# Patient Record
Sex: FEMALE | Race: BLACK OR AFRICAN AMERICAN | NOT HISPANIC OR LATINO | Employment: OTHER | URBAN - METROPOLITAN AREA
[De-identification: names, ages, dates, MRNs, and addresses within clinical notes are randomized per-mention and may not be internally consistent; named-entity substitution may affect disease eponyms.]

---

## 2017-01-11 ENCOUNTER — LAB VISIT (OUTPATIENT)
Dept: LAB | Facility: HOSPITAL | Age: 67
End: 2017-01-11
Attending: INTERNAL MEDICINE
Payer: MEDICARE

## 2017-01-11 ENCOUNTER — HOSPITAL ENCOUNTER (OUTPATIENT)
Dept: RADIOLOGY | Facility: HOSPITAL | Age: 67
Discharge: HOME | End: 2017-01-11
Attending: INTERNAL MEDICINE
Payer: MEDICARE

## 2017-01-11 DIAGNOSIS — C34.92 SQUAMOUS CELL CARCINOMA OF LEFT LUNG: ICD-10-CM

## 2017-01-11 LAB
ALBUMIN SERPL BCP-MCNC: 3.8 G/DL
ALP SERPL-CCNC: 109 U/L
ALT SERPL W/O P-5'-P-CCNC: 7 U/L
ANION GAP SERPL CALC-SCNC: 7 MMOL/L
AST SERPL-CCNC: 13 U/L
BASOPHILS # BLD AUTO: 0.01 K/UL
BASOPHILS NFR BLD: 0.2 %
BILIRUB SERPL-MCNC: 0.4 MG/DL
BUN SERPL-MCNC: 15 MG/DL
CALCIUM SERPL-MCNC: 9.9 MG/DL
CHLORIDE SERPL-SCNC: 105 MMOL/L
CO2 SERPL-SCNC: 27 MMOL/L
CREAT SERPL-MCNC: 0.8 MG/DL
DIFFERENTIAL METHOD: ABNORMAL
EOSINOPHIL # BLD AUTO: 0.1 K/UL
EOSINOPHIL NFR BLD: 1.3 %
ERYTHROCYTE [DISTWIDTH] IN BLOOD BY AUTOMATED COUNT: 19 %
EST. GFR  (AFRICAN AMERICAN): >60 ML/MIN/1.73 M^2
EST. GFR  (NON AFRICAN AMERICAN): >60 ML/MIN/1.73 M^2
GLUCOSE SERPL-MCNC: 97 MG/DL
HCT VFR BLD AUTO: 31 %
HGB BLD-MCNC: 9.5 G/DL
LYMPHOCYTES # BLD AUTO: 1.3 K/UL
LYMPHOCYTES NFR BLD: 25 %
MAGNESIUM SERPL-MCNC: 1.7 MG/DL
MCH RBC QN AUTO: 26 PG
MCHC RBC AUTO-ENTMCNC: 30.6 %
MCV RBC AUTO: 85 FL
MONOCYTES # BLD AUTO: 0.5 K/UL
MONOCYTES NFR BLD: 9.1 %
NEUTROPHILS # BLD AUTO: 3.3 K/UL
NEUTROPHILS NFR BLD: 64.2 %
PHOSPHATE SERPL-MCNC: 3.1 MG/DL
PLATELET # BLD AUTO: 362 K/UL
PMV BLD AUTO: 11.4 FL
POTASSIUM SERPL-SCNC: 3.7 MMOL/L
PROT SERPL-MCNC: 7.5 G/DL
RBC # BLD AUTO: 3.66 M/UL
SODIUM SERPL-SCNC: 139 MMOL/L
WBC # BLD AUTO: 5.19 K/UL

## 2017-01-11 PROCEDURE — 80053 COMPREHEN METABOLIC PANEL: CPT

## 2017-01-11 PROCEDURE — 83735 ASSAY OF MAGNESIUM: CPT

## 2017-01-11 PROCEDURE — 84100 ASSAY OF PHOSPHORUS: CPT

## 2017-01-11 PROCEDURE — 36415 COLL VENOUS BLD VENIPUNCTURE: CPT

## 2017-01-11 PROCEDURE — 71260 CT THORAX DX C+: CPT | Mod: 26,,, | Performed by: RADIOLOGY

## 2017-01-11 PROCEDURE — 85025 COMPLETE CBC W/AUTO DIFF WBC: CPT

## 2017-01-11 PROCEDURE — 74177 CT ABD & PELVIS W/CONTRAST: CPT | Mod: 26,,, | Performed by: RADIOLOGY

## 2017-01-11 RX ADMIN — IOHEXOL 30 ML: 350 INJECTION, SOLUTION INTRAVENOUS at 07:01

## 2017-01-11 RX ADMIN — IOHEXOL 75 ML: 350 INJECTION, SOLUTION INTRAVENOUS at 09:01

## 2017-01-12 ENCOUNTER — OFFICE VISIT (OUTPATIENT)
Dept: HEMATOLOGY/ONCOLOGY | Facility: CLINIC | Age: 67
End: 2017-01-12
Payer: MEDICARE

## 2017-01-12 VITALS
SYSTOLIC BLOOD PRESSURE: 134 MMHG | OXYGEN SATURATION: 98 % | HEART RATE: 77 BPM | TEMPERATURE: 98 F | BODY MASS INDEX: 19.27 KG/M2 | WEIGHT: 104.75 LBS | DIASTOLIC BLOOD PRESSURE: 64 MMHG | HEIGHT: 62 IN

## 2017-01-12 DIAGNOSIS — C78.02 MALIGNANT NEOPLASM METASTATIC TO LEFT LUNG: ICD-10-CM

## 2017-01-12 DIAGNOSIS — C34.92 SQUAMOUS CELL CARCINOMA OF LEFT LUNG: Primary | ICD-10-CM

## 2017-01-12 DIAGNOSIS — C34.92 STAGE IV SQUAMOUS CELL CARCINOMA OF LEFT LUNG: ICD-10-CM

## 2017-01-12 PROCEDURE — 99499 UNLISTED E&M SERVICE: CPT | Mod: S$PBB,,, | Performed by: INTERNAL MEDICINE

## 2017-01-12 PROCEDURE — 99999 PR PBB SHADOW E&M-EST. PATIENT-LVL III: CPT | Mod: PBBFAC,,, | Performed by: INTERNAL MEDICINE

## 2017-01-12 PROCEDURE — 3078F DIAST BP <80 MM HG: CPT | Mod: S$GLB,,, | Performed by: INTERNAL MEDICINE

## 2017-01-12 PROCEDURE — 99215 OFFICE O/P EST HI 40 MIN: CPT | Mod: S$GLB,,, | Performed by: INTERNAL MEDICINE

## 2017-01-12 PROCEDURE — 1126F AMNT PAIN NOTED NONE PRSNT: CPT | Mod: S$GLB,,, | Performed by: INTERNAL MEDICINE

## 2017-01-12 PROCEDURE — 1159F MED LIST DOCD IN RCRD: CPT | Mod: S$GLB,,, | Performed by: INTERNAL MEDICINE

## 2017-01-12 PROCEDURE — 1160F RVW MEDS BY RX/DR IN RCRD: CPT | Mod: S$GLB,,, | Performed by: INTERNAL MEDICINE

## 2017-01-12 PROCEDURE — 3075F SYST BP GE 130 - 139MM HG: CPT | Mod: S$GLB,,, | Performed by: INTERNAL MEDICINE

## 2017-01-12 PROCEDURE — 1157F ADVNC CARE PLAN IN RCRD: CPT | Mod: S$GLB,,, | Performed by: INTERNAL MEDICINE

## 2017-01-12 NOTE — PROGRESS NOTES
Subjective:       Patient ID: Andree Quiroz is a 66 y.o. female.    Chief Complaint: Lung Cancer (CT, lab results)    Lung Cancer   Pertinent negatives include no abdominal pain, chest pain, coughing, fatigue, fever, headaches, nausea, rash or weakness.      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  She had some nausea and dizziness at [employer], went to the ER, chest x-ray was ordered that showed a left suprahilar lesion.  CT of the chest with contrast 9/20/15 Showed 6.5 by 4.4 centimeter left upper lobe mass abutting the mediastinum.  CT-guided biopsy showed squamous cell carcinoma. PET scan did not show any extrathoracic disease. EBUS revealed - Station 4R (contralateral mediastinal lymph node) was negative. Station 7 (subcarinal) was positive for squamous cell carcinoma. Was felt not to be a surgical candidate.    Nov-Dec 2015 - Received concurrent ChemoRT with weekly carbo/taxol.  PET scan July 2016 showed progressive disease.    August 2016 - 2nd line chemotherapy with carboplatin and gemcitabine was started.    Here for follow-up  Denies any chest pain, difficulty breathing or hemoptysis.  No new pains.  No cough.  Continues to stay active.  She was not able to afford Megace but is trying to eat and maintain her weight.  Underwent CT scan and is here to discuss test results.    Review of Systems   Constitutional: Negative for appetite change, fatigue, fever and unexpected weight change.   HENT: Negative for facial swelling and nosebleeds.    Eyes: Negative for photophobia and pain.   Respiratory: Negative for cough and shortness of breath.    Cardiovascular: Negative for chest pain and leg swelling.   Gastrointestinal: Negative for abdominal pain, blood in stool, constipation and nausea.   Genitourinary: Negative for dysuria and hematuria.   Skin: Negative for color change and rash.   Neurological: Negative for seizures, weakness and headaches.    Hematological: Negative for adenopathy. Does not bruise/bleed easily.   All other systems reviewed and are negative.        Objective:      Physical Exam   Constitutional: She is oriented to person, place, and time. She appears well-developed and well-nourished. No distress.   HENT:   Mouth/Throat: Oropharynx is clear and moist and mucous membranes are normal. Mucous membranes are not pale. No oropharyngeal exudate.   Eyes: Conjunctivae are normal. No scleral icterus.   Neck: Normal range of motion. Neck supple. No thyroid mass (Thyroid area is non-tender) and no thyromegaly present.   Cardiovascular: Normal rate and regular rhythm.  Exam reveals no friction rub.    Pulmonary/Chest: Effort normal and breath sounds normal. No accessory muscle usage or stridor. No respiratory distress. She has no wheezes.   Abdominal: She exhibits no ascites and no mass. There is no hepatosplenomegaly. There is no tenderness.   Musculoskeletal: She exhibits no edema.   No varicosities noted.   Lymphadenopathy:     She has no cervical adenopathy.        Right: No supraclavicular adenopathy present.        Left: No supraclavicular adenopathy present.   Neurological: She is alert and oriented to person, place, and time.   Psychiatric: She has a normal mood and affect. Judgment and thought content normal. Cognition and memory are normal. She exhibits normal recent memory and normal remote memory.       Assessment:     1. Squamous cell carcinoma of left lung    2. 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 Malignant neoplasm metastatic to left lung       Plan:   CT chest 1/11/17 - Continued progression of disease with interval increase in size of bilateral pulmonary nodules.    Reviewed results independently and with her in detail.  There is clear evidence of progressive disease.  Will discontinue carboplatin and gemcitabine chemotherapy.    Discussed treatment with immunotherapy.  Discussed Keytruda versus opdivo.  Discussed  differences.  Provided printed information on both.    Will send her tumor sample for PDL 1 testing.  She is agreeable with this.  Depending on the results we'll decide if she should be started on Keytruda or opdivo.    All questions answered.  Return to clinic when test results available.

## 2017-01-23 ENCOUNTER — TELEPHONE (OUTPATIENT)
Dept: HEMATOLOGY/ONCOLOGY | Facility: CLINIC | Age: 67
End: 2017-01-23

## 2017-01-23 NOTE — TELEPHONE ENCOUNTER
Wants to know if you have heard anything back from pathology re: her results. She has been reviewing the information on two medications you were recommending. She is worried about all of the side effects.     ----- Message from Eloina Damon sent at 1/23/2017  2:17 PM CST -----  Contact: self/[phone]  Patient is calling to get her test results.  Please advise

## 2017-01-26 NOTE — TELEPHONE ENCOUNTER
Pt informed that per Dr. Sandra, pathology results still pending and may take another week or so. Instructed pt to call back in one week if she has not heard from anyone. Pt verbalized understanding.

## 2017-01-26 NOTE — TELEPHONE ENCOUNTER
Dr. Sandra states he has received pt's pathology results so I called and scheduled pt for appt on 1/31/17 to discuss the results.

## 2017-01-31 ENCOUNTER — OFFICE VISIT (OUTPATIENT)
Dept: HEMATOLOGY/ONCOLOGY | Facility: CLINIC | Age: 67
End: 2017-01-31
Payer: MEDICARE

## 2017-01-31 VITALS
WEIGHT: 103.19 LBS | TEMPERATURE: 98 F | HEART RATE: 86 BPM | DIASTOLIC BLOOD PRESSURE: 62 MMHG | OXYGEN SATURATION: 96 % | SYSTOLIC BLOOD PRESSURE: 124 MMHG | HEIGHT: 62 IN | BODY MASS INDEX: 18.99 KG/M2

## 2017-01-31 DIAGNOSIS — I25.10 CORONARY ARTERY DISEASE INVOLVING NATIVE CORONARY ARTERY OF NATIVE HEART WITHOUT ANGINA PECTORIS: ICD-10-CM

## 2017-01-31 DIAGNOSIS — C78.02 MALIGNANT NEOPLASM METASTATIC TO LEFT LUNG: ICD-10-CM

## 2017-01-31 DIAGNOSIS — C34.92 STAGE IV SQUAMOUS CELL CARCINOMA OF LEFT LUNG: Primary | ICD-10-CM

## 2017-01-31 DIAGNOSIS — Z51.11 ENCOUNTER FOR ANTINEOPLASTIC CHEMOTHERAPY: ICD-10-CM

## 2017-01-31 PROCEDURE — 1157F ADVNC CARE PLAN IN RCRD: CPT | Mod: S$GLB,,, | Performed by: INTERNAL MEDICINE

## 2017-01-31 PROCEDURE — 99214 OFFICE O/P EST MOD 30 MIN: CPT | Mod: S$GLB,,, | Performed by: INTERNAL MEDICINE

## 2017-01-31 PROCEDURE — 3074F SYST BP LT 130 MM HG: CPT | Mod: S$GLB,,, | Performed by: INTERNAL MEDICINE

## 2017-01-31 PROCEDURE — 1160F RVW MEDS BY RX/DR IN RCRD: CPT | Mod: S$GLB,,, | Performed by: INTERNAL MEDICINE

## 2017-01-31 PROCEDURE — 99499 UNLISTED E&M SERVICE: CPT | Mod: S$PBB,,, | Performed by: INTERNAL MEDICINE

## 2017-01-31 PROCEDURE — 99999 PR PBB SHADOW E&M-EST. PATIENT-LVL III: CPT | Mod: PBBFAC,,, | Performed by: INTERNAL MEDICINE

## 2017-01-31 PROCEDURE — 1159F MED LIST DOCD IN RCRD: CPT | Mod: S$GLB,,, | Performed by: INTERNAL MEDICINE

## 2017-01-31 PROCEDURE — 1126F AMNT PAIN NOTED NONE PRSNT: CPT | Mod: S$GLB,,, | Performed by: INTERNAL MEDICINE

## 2017-01-31 PROCEDURE — 3078F DIAST BP <80 MM HG: CPT | Mod: S$GLB,,, | Performed by: INTERNAL MEDICINE

## 2017-01-31 RX ORDER — HEPARIN 100 UNIT/ML
500 SYRINGE INTRAVENOUS
Status: CANCELLED | OUTPATIENT
Start: 2017-04-03

## 2017-01-31 RX ORDER — SODIUM CHLORIDE 0.9 % (FLUSH) 0.9 %
10 SYRINGE (ML) INJECTION
Status: CANCELLED | OUTPATIENT
Start: 2017-04-03

## 2017-01-31 NOTE — PROGRESS NOTES
Subjective:       Patient ID: Andree Quiroz is a 66 y.o. female.    Chief Complaint: Lung Cancer (pathology results)    Lung Cancer   Pertinent negatives include no abdominal pain, chest pain, coughing, fatigue, fever, headaches, nausea, rash or weakness.      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  She had some nausea and dizziness at [employer], went to the ER, chest x-ray was ordered that showed a left suprahilar lesion.  CT of the chest with contrast 9/20/15 Showed 6.5 by 4.4 centimeter left upper lobe mass abutting the mediastinum.  CT-guided biopsy showed squamous cell carcinoma. PET scan did not show any extrathoracic disease. EBUS revealed - Station 4R (contralateral mediastinal lymph node) was negative. Station 7 (subcarinal) was positive for squamous cell carcinoma. Was felt not to be a surgical candidate.    Nov-Dec 2015 - Received concurrent ChemoRT with weekly carbo/taxol.  PET scan July 2016 showed progressive disease.    August 2016 - 2nd line chemotherapy with carboplatin and gemcitabine was started.    Here for follow-up  Denies any chest pain, difficulty breathing or hemoptysis.  No new pains.  No cough.  Continues to stay active.    CT chest 1/11/17 - Continued progression of disease with interval increase in size of bilateral pulmonary nodules.    PDL 1 testing ordered on lung biopsy sample, patient here to discuss test results.    Review of Systems   Constitutional: Negative for appetite change, fatigue, fever and unexpected weight change.   HENT: Negative for facial swelling and nosebleeds.    Eyes: Negative for photophobia and pain.   Respiratory: Negative for cough and shortness of breath.    Cardiovascular: Negative for chest pain and leg swelling.   Gastrointestinal: Negative for abdominal pain, blood in stool, constipation and nausea.   Genitourinary: Negative for dysuria and hematuria.   Skin: Negative for color change and rash.    Neurological: Negative for seizures, weakness and headaches.   Hematological: Negative for adenopathy. Does not bruise/bleed easily.   All other systems reviewed and are negative.        Objective:      Physical Exam   Constitutional: She is oriented to person, place, and time. She appears well-developed and well-nourished. No distress.   HENT:   Mouth/Throat: Oropharynx is clear and moist and mucous membranes are normal. Mucous membranes are not pale. No oropharyngeal exudate.   Eyes: Conjunctivae are normal. No scleral icterus.   Neck: Normal range of motion. Neck supple. No thyroid mass (Thyroid area is non-tender) and no thyromegaly present.   Cardiovascular: Normal rate and regular rhythm.  Exam reveals no friction rub.    Pulmonary/Chest: Effort normal and breath sounds normal. No accessory muscle usage or stridor. No respiratory distress. She has no wheezes.   Abdominal: She exhibits no ascites and no mass. There is no hepatosplenomegaly. There is no tenderness.   Musculoskeletal: She exhibits no edema.   No varicosities noted.   Lymphadenopathy:     She has no cervical adenopathy.        Right: No supraclavicular adenopathy present.        Left: No supraclavicular adenopathy present.   Neurological: She is alert and oriented to person, place, and time.   Psychiatric: She has a normal mood and affect. Judgment and thought content normal. Cognition and memory are normal. She exhibits normal recent memory and normal remote memory.       Assessment:     1. 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 Encounter for antineoplastic chemotherapy    3. Malignant neoplasm metastatic to left lung    4. Coronary artery disease involving native coronary artery of native heart without angina pectoris       Plan:   PDL 1 testing shows 10% (low expression) TPS - tumor proportion score.  She is hence PDL 1 positive, although low expression.    Discussed starting KEYTRUDA versus opdivo.  Discussed pros and cons of  each of these approaches.  She had the opportunity to review literature on both these agents and answered her many questions in this regard.  We discussed potential side effects of a immune mediated pneumonitis, immune mediated enteritis, immune mediated hepatitis and other such immune mediated side effects with this class of immunotherapy agents.    She states she wants to take care of her teeth and eyes - see the concerning specialists.   Wants to start her treatment in about 3 weeks.  Will honor her wishes.

## 2017-02-02 ENCOUNTER — TELEPHONE (OUTPATIENT)
Dept: HEMATOLOGY/ONCOLOGY | Facility: CLINIC | Age: 67
End: 2017-02-02

## 2017-02-02 DIAGNOSIS — R53.1 WEAKNESS: Primary | ICD-10-CM

## 2017-02-02 NOTE — TELEPHONE ENCOUNTER
Notified patient that startdate for Keytruda is Feb 20 as she had requested 3-week break when she was here on 1/31.  Patient now wants to wait til March as she feels her body needs a longer break.  Dr Sandra gave ok.  Will start March 13.  Patient agreeable.  Labs 3/10 at [facility].  Will mail her schedule.

## 2017-02-03 ENCOUNTER — HOSPITAL ENCOUNTER (OUTPATIENT)
Facility: HOSPITAL | Age: 67
Discharge: HOME | End: 2017-02-04
Attending: EMERGENCY MEDICINE | Admitting: INTERNAL MEDICINE
Payer: MEDICARE
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
## 2017-02-03 DIAGNOSIS — C34.92 STAGE IV SQUAMOUS CELL CARCINOMA OF LEFT LUNG: ICD-10-CM

## 2017-02-03 DIAGNOSIS — D50.9 MICROCYTIC ANEMIA: ICD-10-CM

## 2017-02-03 DIAGNOSIS — E78.5 HYPERLIPIDEMIA, UNSPECIFIED HYPERLIPIDEMIA TYPE: ICD-10-CM

## 2017-02-03 DIAGNOSIS — R53.1 WEAKNESS: ICD-10-CM

## 2017-02-03 DIAGNOSIS — C34.91 SQUAMOUS CELL CARCINOMA OF LUNGS, BILATERAL: ICD-10-CM

## 2017-02-03 DIAGNOSIS — J06.9 VIRAL UPPER RESPIRATORY ILLNESS: ICD-10-CM

## 2017-02-03 DIAGNOSIS — I10 ESSENTIAL HYPERTENSION: ICD-10-CM

## 2017-02-03 DIAGNOSIS — C34.92 SQUAMOUS CELL CARCINOMA OF LUNGS, BILATERAL: ICD-10-CM

## 2017-02-03 DIAGNOSIS — J18.9 HOSPITAL-ACQUIRED PNEUMONIA: Primary | ICD-10-CM

## 2017-02-03 DIAGNOSIS — I25.10 CORONARY ARTERY DISEASE INVOLVING NATIVE CORONARY ARTERY OF NATIVE HEART WITHOUT ANGINA PECTORIS: ICD-10-CM

## 2017-02-03 DIAGNOSIS — Z72.0 TOBACCO ABUSE: ICD-10-CM

## 2017-02-03 LAB
ALBUMIN SERPL BCP-MCNC: 3 G/DL
ALP SERPL-CCNC: 100 U/L
ALT SERPL W/O P-5'-P-CCNC: 7 U/L
ANION GAP SERPL CALC-SCNC: 9 MMOL/L
AST SERPL-CCNC: 11 U/L
BASOPHILS # BLD AUTO: 0.01 K/UL
BASOPHILS NFR BLD: 0.2 %
BILIRUB SERPL-MCNC: 0.3 MG/DL
BNP SERPL-MCNC: 82 PG/ML
BUN SERPL-MCNC: 8 MG/DL
CALCIUM SERPL-MCNC: 8.2 MG/DL
CHLORIDE SERPL-SCNC: 109 MMOL/L
CO2 SERPL-SCNC: 21 MMOL/L
CREAT SERPL-MCNC: 0.7 MG/DL
DIFFERENTIAL METHOD: ABNORMAL
EOSINOPHIL # BLD AUTO: 0.1 K/UL
EOSINOPHIL NFR BLD: 1.8 %
ERYTHROCYTE [DISTWIDTH] IN BLOOD BY AUTOMATED COUNT: 18.6 %
EST. GFR  (AFRICAN AMERICAN): >60 ML/MIN/1.73 M^2
EST. GFR  (NON AFRICAN AMERICAN): >60 ML/MIN/1.73 M^2
GLUCOSE SERPL-MCNC: 123 MG/DL
HCT VFR BLD AUTO: 26.5 %
HGB BLD-MCNC: 8.2 G/DL
LYMPHOCYTES # BLD AUTO: 0.4 K/UL
LYMPHOCYTES NFR BLD: 7.5 %
MCH RBC QN AUTO: 24.8 PG
MCHC RBC AUTO-ENTMCNC: 30.9 %
MCV RBC AUTO: 80 FL
MONOCYTES # BLD AUTO: 0.7 K/UL
MONOCYTES NFR BLD: 12.5 %
NEUTROPHILS # BLD AUTO: 4.3 K/UL
NEUTROPHILS NFR BLD: 78 %
PLATELET # BLD AUTO: 247 K/UL
PMV BLD AUTO: 11.6 FL
POCT GLUCOSE: 123 MG/DL (ref 70–110)
POTASSIUM SERPL-SCNC: 3.3 MMOL/L
PROT SERPL-MCNC: 6.2 G/DL
RBC # BLD AUTO: 3.3 M/UL
SODIUM SERPL-SCNC: 139 MMOL/L
TROPONIN I SERPL DL<=0.01 NG/ML-MCNC: <0.006 NG/ML
WBC # BLD AUTO: 5.46 K/UL

## 2017-02-03 PROCEDURE — 96361 HYDRATE IV INFUSION ADD-ON: CPT

## 2017-02-03 PROCEDURE — 82746 ASSAY OF FOLIC ACID SERUM: CPT

## 2017-02-03 PROCEDURE — 93005 ELECTROCARDIOGRAM TRACING: CPT

## 2017-02-03 PROCEDURE — 93010 ELECTROCARDIOGRAM REPORT: CPT | Mod: ,,, | Performed by: INTERNAL MEDICINE

## 2017-02-03 PROCEDURE — 82962 GLUCOSE BLOOD TEST: CPT

## 2017-02-03 PROCEDURE — 96365 THER/PROPH/DIAG IV INF INIT: CPT

## 2017-02-03 PROCEDURE — 99285 EMERGENCY DEPT VISIT HI MDM: CPT | Mod: 25

## 2017-02-03 PROCEDURE — 83540 ASSAY OF IRON: CPT

## 2017-02-03 PROCEDURE — 81003 URINALYSIS AUTO W/O SCOPE: CPT

## 2017-02-03 PROCEDURE — 82607 VITAMIN B-12: CPT

## 2017-02-03 PROCEDURE — 84484 ASSAY OF TROPONIN QUANT: CPT

## 2017-02-03 PROCEDURE — 85610 PROTHROMBIN TIME: CPT

## 2017-02-03 PROCEDURE — 85025 COMPLETE CBC W/AUTO DIFF WBC: CPT

## 2017-02-03 PROCEDURE — 82728 ASSAY OF FERRITIN: CPT

## 2017-02-03 PROCEDURE — 80053 COMPREHEN METABOLIC PANEL: CPT

## 2017-02-03 PROCEDURE — 25000003 PHARM REV CODE 250: Performed by: EMERGENCY MEDICINE

## 2017-02-03 PROCEDURE — 83880 ASSAY OF NATRIURETIC PEPTIDE: CPT

## 2017-02-03 RX ORDER — SODIUM CHLORIDE 9 MG/ML
1000 INJECTION, SOLUTION INTRAVENOUS
Status: COMPLETED | OUTPATIENT
Start: 2017-02-03 | End: 2017-02-04

## 2017-02-03 RX ADMIN — SODIUM CHLORIDE 1000 ML: 0.9 INJECTION, SOLUTION INTRAVENOUS at 11:02

## 2017-02-03 NOTE — IP AVS SNAPSHOT
[facility]  [address]  [place] [address]  Phone: [phone]           Patient Discharge Instructions     Our goal is to set you up for success. This packet includes information on your condition, medications, and your home care. It will help you to care for yourself so you don't get sicker and need to go back to the hospital.     Please ask your nurse if you have any questions.        There are many details to remember when preparing to leave the hospital. Here is what you will need to do:    1. Take your medicine. If you are prescribed medications, review your Medication List in the following pages. You may have new medications to  at the pharmacy and others that you'll need to stop taking. Review the instructions for how and when to take your medications. Talk with your doctor or nurses if you are unsure of what to do.     2. Go to your follow-up appointments. Specific follow-up information is listed in the following pages. Your may be contacted by a transition nurse or clinical provider about future appointments. Be sure we have all of the phone numbers to reach you, if needed. Please contact your provider's office if you are unable to make an appointment.     3. Watch for warning signs. Your doctor or nurse will give you detailed warning signs to watch for and when to call for assistance. These instructions may also include educational information about your condition. If you experience any of warning signs to your health, call your doctor.               ** Verify the list of medication(s) below is accurate and up to date. Carry this with you in case of emergency. If your medications have changed, please notify your healthcare provider.             Medication List      START taking these medications        Additional Info                      benzonatate 100 MG capsule   Commonly known as:  TESSALON   Quantity:  15 capsule   Refills:  1   Dose:  100 mg    Instructions:  Take 1  capsule (100 mg total) by mouth 3 (three) times daily as needed for Cough.     Begin Date    AM    Noon    PM    Bedtime       docusate sodium 100 MG capsule   Commonly known as:  COLACE   Quantity:  60 capsule   Refills:  2   Dose:  100 mg    Instructions:  Take 1 capsule (100 mg total) by mouth 2 (two) times daily.     Begin Date    AM    Noon    PM    Bedtime       ferrous sulfate 325 (65 FE) MG EC tablet   Quantity:  60 tablet   Refills:  2   Dose:  325 mg    Instructions:  Take 1 tablet (325 mg total) by mouth 2 (two) times daily with meals.     Begin Date    AM    Noon    PM    Bedtime       moxifloxacin 400 mg tablet   Commonly known as:  AVELOX   Quantity:  5 tablet   Refills:  0   Dose:  400 mg   Indications:  Pneumonia    Last time this was given:  400 mg on 2/4/2017  1:05 PM   Instructions:  Take 1 tablet (400 mg total) by mouth once daily.     Begin Date    AM    Noon    PM    Bedtime       oseltamivir 30 MG capsule   Commonly known as:  TAMIFLU   Quantity:  9 capsule   Refills:  0   Dose:  30 mg    Last time this was given:  30 mg on 2/4/2017  5:09 AM   Instructions:  Take 1 capsule (30 mg total) by mouth every 12 (twelve) hours.     Begin Date    AM    Noon    PM    Bedtime         CONTINUE taking these medications        Additional Info                      amlodipine-benazepril 5-20 mg 5-20 mg per capsule   Commonly known as:  LOTREL   Quantity:  30 capsule   Refills:  2    Last time this was given:  1 capsule on 2/4/2017 10:23 AM   Instructions:  TAKE 1 CAPSULE BY MOUTH ONCE DAILY.     Begin Date    AM    Noon    PM    Bedtime       aspirin 81 MG EC tablet   Commonly known as:  ECOTRIN   Refills:  0   Dose:  81 mg    Last time this was given:  81 mg on 2/4/2017 10:23 AM   Instructions:  Take 81 mg by mouth once daily.     Begin Date    AM    Noon    PM    Bedtime       atorvastatin 20 MG tablet   Commonly known as:  LIPITOR   Quantity:  30 tablet   Refills:  12    Last time this was given:  10 mg on  2/4/2017 10:23 AM   Instructions:  TAKE 1/2 TABLET AT BEDTIME     Begin Date    AM    Noon    PM    Bedtime       carvedilol 12.5 MG tablet   Commonly known as:  COREG   Quantity:  180 tablet   Refills:  3   Dose:  12.5 mg    Last time this was given:  12.5 mg on 2/4/2017  8:20 AM   Instructions:  Take 1 tablet (12.5 mg total) by mouth 2 (two) times daily with meals.     Begin Date    AM    Noon    PM    Bedtime       clopidogrel 75 mg tablet   Commonly known as:  PLAVIX   Quantity:  30 tablet   Refills:  3   Dose:  75 mg    Last time this was given:  75 mg on 2/4/2017 10:22 AM   Instructions:  Take 1 tablet (75 mg total) by mouth once daily.     Begin Date    AM    Noon    PM    Bedtime       lactulose 10 gram/15 mL solution   Commonly known as:  CHRONULAC   Refills:  0    Instructions:  Take by mouth 3 (three) times daily. TAKES AS NEEDED FOR CONSTIPATION     Begin Date    AM    Noon    PM    Bedtime       ondansetron 4 MG tablet   Commonly known as:  ZOFRAN   Quantity:  20 tablet   Refills:  0   Dose:  4 mg    Instructions:  Take 1 tablet (4 mg total) by mouth every 8 (eight) hours as needed.     Begin Date    AM    Noon    PM    Bedtime            Where to Get Your Medications      You can get these medications from any pharmacy     Bring a paper prescription for each of these medications     benzonatate 100 MG capsule    docusate sodium 100 MG capsule    ferrous sulfate 325 (65 FE) MG EC tablet    moxifloxacin 400 mg tablet    oseltamivir 30 MG capsule                  Please bring to all follow up appointments:    1. A copy of your discharge instructions.  2. All medicines you are currently taking in their original bottles.  3. Identification and insurance card.    Please arrive 15 minutes ahead of scheduled appointment time.    Please call 24 hours in advance if you must reschedule your appointment and/or time.        Your Scheduled Appointments     Mar 10, 2017 10:00 AM CST   Non-Fasting Lab with APPOINTMENT  LAB, [facility]    [address]  [place] [address]   [phone]            Mar 13, 2017  1:00 PM CDT   Established Patient Visit with MD [place] Head - Hematology Oncology [facility])    [address] [id number] [phone]            Mar 13, 2017  1:30 PM CDT   Infusion 120 Min with CHAIR 04 [facility]    [address], [address]  [place] [address]   [phone]            Mar 31, 2017 10:00 AM CDT   Non-Fasting Lab with HOSPITAL LAB, [facility] ([facility])    [address] [place] [id number]               Apr 03, 2017 10:00 AM CDT   Established Patient Visit with MD [place] Head - Hematology Oncology [facility])    [address] [place] [place] [address] [phone]              Follow-up Information     Follow up with Bertha Sherwood NP. Schedule an appointment as soon as possible for a visit in 1 week.    Specialty:  Family Medicine    Contact information:    [address]  [address]  [facility] [address] [phone]          Follow up with Saul Sandra MD. Schedule an appointment as soon as possible for a visit in 1 week.    Specialties:  Hematology and Oncology, Hematology    Contact information:    [address] [place] [address]  [phone]          Discharge Instructions     Future Orders    Activity as tolerated     Call MD for:  difficulty breathing or increased cough     Call MD for:  increased confusion or weakness     Call MD for:  persistent dizziness, light-headedness, or visual disturbances     Call MD for:  redness, tenderness, or signs of infection (pain, swelling, redness, odor or green/yellow discharge around incision site)     Call MD for:  temperature >100.4     Diet general     Questions:    Total calories:      Fat restriction, if any:      Protein  "restriction, if any:      Na restriction, if any:      Fluid restriction:      Additional restrictions:  Cardiac (Low Na/Chol)        Discharge Instructions       BENZONATATE CAPSULES    Discharge References/Attachments     BENZONATATE CAPSULES (ENGLISH)    MOXIFLOXACIN TABLETS (ENGLISH)    IRON TABLETS, CAPSULES, EXTENDED-RELEASE TABLETS (ENGLISH)    DOCUSATE CAPSULES (ENGLISH)    OSELTAMIVIR CAPSULES (ENGLISH)    PARTNER IP AVS PNEUMONIA DISCHARGE INSTRUCTIONS [facility]        Primary Diagnosis     Your primary diagnosis was:  Squamous Cell Carcinoma Of Lungs, Bilateral      Admission Information     Date & Time Provider Department CSN    2/3/2017 10:55 PM Manisha Lara MD [facility] [id number]       Admisson Diagnosis: Weakness, Hospital-acquired pneumonia, Stage IV squamous cell carcinoma of left lung      Care Providers     Provider Role Specialty Primary office phone    Manisha Lara MD Attending Provider Internal Medicine [phone]      Your Vitals Were     BP Pulse Temp Resp Height Weight    134/64 (Patient Position: Lying, BP Method: Automatic) 94 99.7 °F (37.6 °C) 20 5' 1" (1.549 m) 48.5 kg (106 lb 14.8 oz)    Last Period SpO2 BMI          (LMP Unknown) 96% 20.2 kg/m2        Recent Lab Values        4/26/2012 9/20/2015                        9:55 AM 11:54 PM          A1C 5.1 5.5                     Pending Labs     Order Current Status    Blood culture #1 Preliminary result    Blood culture #2 Preliminary result      Allergies as of 2/4/2017        Reactions    Codeine Other (See Comments)    "Made me nervous and shaky"      [facility]     [facility] Nurse Care Line - 24/7 Assistance  Unless otherwise directed by your provider, please contact [facility], our nurse care line that is available for 24/7 assistance.     Registered nurses in the [facility] provide clinical advisement, health education, appointment booking, and other advisory services.  Call for this " free service at [phone].        Advance Directives     An advance directive is a document which, in the event you are no longer able to make decisions for yourself, tells your healthcare team what kind of treatment you do or do not want to receive, or who you would like to make those decisions for you.  If you do not currently have an advance directive, [facility] encourages you to create one.  For more information call:  [phone]), [phone] ([phone]), or log on to [email or web address].        Smoking Cessation     If you would like to quit smoking:   You may be eligible for free services if you are a [place] resident and started smoking cigarettes before September 1, 1988.  Call the Smoking [facility]essation [facility] toll free at [phone] or [phone].      Call 0-345-QUIT-NOW if you do not meet the above criteria.        Translation Services Information     ATTENTION: Language assistance services are available, free of charge. Please call [phone].    ATENCIÓN: Si habla español, tiene a keith disposición servicios gratuitos de asistencia lingüística. Llame al [phone].     CHÚ Ý: N?u b?n nói Ti?ng Vi?t, có các d?ch v? h? tr? ngôn ng? mi?n phí dành cho b?n. G?i s? [phone].        Pneumonmia Discharge Instructions                 [facility] complies with applicable Federal civil rights laws and does not discriminate on the basis of race, color, national origin, age, disability, or sex.

## 2017-02-03 NOTE — IP AVS SNAPSHOT
[facility]  [address]  [place] [address]  Phone: [phone]           Patient Discharge Instructions     Our goal is to set you up for success. This packet includes information on your condition, medications, and your home care. It will help you to care for yourself so you don't get sicker and need to go back to the hospital.     Please ask your nurse if you have any questions.        There are many details to remember when preparing to leave the hospital. Here is what you will need to do:    1. Take your medicine. If you are prescribed medications, review your Medication List in the following pages. You may have new medications to  at the pharmacy and others that you'll need to stop taking. Review the instructions for how and when to take your medications. Talk with your doctor or nurses if you are unsure of what to do.     2. Go to your follow-up appointments. Specific follow-up information is listed in the following pages. Your may be contacted by a transition nurse or clinical provider about future appointments. Be sure we have all of the phone numbers to reach you, if needed. Please contact your provider's office if you are unable to make an appointment.     3. Watch for warning signs. Your doctor or nurse will give you detailed warning signs to watch for and when to call for assistance. These instructions may also include educational information about your condition. If you experience any of warning signs to your health, call your doctor.               ** Verify the list of medication(s) below is accurate and up to date. Carry this with you in case of emergency. If your medications have changed, please notify your healthcare provider.             Medication List      START taking these medications        Additional Info                      benzonatate 100 MG capsule   Commonly known as:  TESSALON   Quantity:  15 capsule   Refills:  1   Dose:  100 mg    Instructions:  Take 1  capsule (100 mg total) by mouth 3 (three) times daily as needed for Cough.     Begin Date    AM    Noon    PM    Bedtime       docusate sodium 100 MG capsule   Commonly known as:  COLACE   Quantity:  60 capsule   Refills:  2   Dose:  100 mg    Instructions:  Take 1 capsule (100 mg total) by mouth 2 (two) times daily.     Begin Date    AM    Noon    PM    Bedtime       ferrous sulfate 325 (65 FE) MG EC tablet   Quantity:  60 tablet   Refills:  2   Dose:  325 mg    Instructions:  Take 1 tablet (325 mg total) by mouth 2 (two) times daily with meals.     Begin Date    AM    Noon    PM    Bedtime       moxifloxacin 400 mg tablet   Commonly known as:  AVELOX   Quantity:  5 tablet   Refills:  0   Dose:  400 mg   Indications:  Pneumonia    Last time this was given:  400 mg on 2/4/2017  1:05 PM   Instructions:  Take 1 tablet (400 mg total) by mouth once daily.     Begin Date    AM    Noon    PM    Bedtime       oseltamivir 30 MG capsule   Commonly known as:  TAMIFLU   Quantity:  9 capsule   Refills:  0   Dose:  30 mg    Last time this was given:  30 mg on 2/4/2017  5:09 AM   Instructions:  Take 1 capsule (30 mg total) by mouth every 12 (twelve) hours.     Begin Date    AM    Noon    PM    Bedtime         CONTINUE taking these medications        Additional Info                      amlodipine-benazepril 5-20 mg 5-20 mg per capsule   Commonly known as:  LOTREL   Quantity:  30 capsule   Refills:  2    Last time this was given:  1 capsule on 2/4/2017 10:23 AM   Instructions:  TAKE 1 CAPSULE BY MOUTH ONCE DAILY.     Begin Date    AM    Noon    PM    Bedtime       aspirin 81 MG EC tablet   Commonly known as:  ECOTRIN   Refills:  0   Dose:  81 mg    Last time this was given:  81 mg on 2/4/2017 10:23 AM   Instructions:  Take 81 mg by mouth once daily.     Begin Date    AM    Noon    PM    Bedtime       atorvastatin 20 MG tablet   Commonly known as:  LIPITOR   Quantity:  30 tablet   Refills:  12    Last time this was given:  10 mg on  2/4/2017 10:23 AM   Instructions:  TAKE 1/2 TABLET AT BEDTIME     Begin Date    AM    Noon    PM    Bedtime       carvedilol 12.5 MG tablet   Commonly known as:  COREG   Quantity:  180 tablet   Refills:  3   Dose:  12.5 mg    Last time this was given:  12.5 mg on 2/4/2017  8:20 AM   Instructions:  Take 1 tablet (12.5 mg total) by mouth 2 (two) times daily with meals.     Begin Date    AM    Noon    PM    Bedtime       clopidogrel 75 mg tablet   Commonly known as:  PLAVIX   Quantity:  30 tablet   Refills:  3   Dose:  75 mg    Last time this was given:  75 mg on 2/4/2017 10:22 AM   Instructions:  Take 1 tablet (75 mg total) by mouth once daily.     Begin Date    AM    Noon    PM    Bedtime       lactulose 10 gram/15 mL solution   Commonly known as:  CHRONULAC   Refills:  0    Instructions:  Take by mouth 3 (three) times daily. TAKES AS NEEDED FOR CONSTIPATION     Begin Date    AM    Noon    PM    Bedtime       ondansetron 4 MG tablet   Commonly known as:  ZOFRAN   Quantity:  20 tablet   Refills:  0   Dose:  4 mg    Instructions:  Take 1 tablet (4 mg total) by mouth every 8 (eight) hours as needed.     Begin Date    AM    Noon    PM    Bedtime            Where to Get Your Medications      You can get these medications from any pharmacy     Bring a paper prescription for each of these medications     benzonatate 100 MG capsule    docusate sodium 100 MG capsule    ferrous sulfate 325 (65 FE) MG EC tablet    moxifloxacin 400 mg tablet    oseltamivir 30 MG capsule                  Please bring to all follow up appointments:    1. A copy of your discharge instructions.  2. All medicines you are currently taking in their original bottles.  3. Identification and insurance card.    Please arrive 15 minutes ahead of scheduled appointment time.    Please call 24 hours in advance if you must reschedule your appointment and/or time.        Your Scheduled Appointments     Mar 10, 2017 10:00 AM CST   Non-Fasting Lab with APPOINTMENT  LAB, [facility]    [address]  [place] [address]   [phone]            Mar 13, 2017  1:00 PM CDT   Established Patient Visit with MD [place] Head - Hematology Oncology [facility])    [address] [id number] [phone]            Mar 13, 2017  1:30 PM CDT   Infusion 120 Min with CHAIR 04 [facility]    [address], [address]  [place] [address]   [phone]            Mar 31, 2017 10:00 AM CDT   Non-Fasting Lab with HOSPITAL LAB, [facility] ([facility])    [address] [place] [id number]               Apr 03, 2017 10:00 AM CDT   Established Patient Visit with MD [place] Head - Hematology Oncology [facility])    [address] [place] [id number] [phone]              Follow-up Information     Follow up with Bertha Sherwood NP. Schedule an appointment as soon as possible for a visit in 1 week.    Specialty:  Family Medicine    Contact information:    [address] [facility] [address]  [address]  [facility] [address] [phone]          Follow up with Saul Sandra MD. Schedule an appointment as soon as possible for a visit in 1 week.    Specialties:  Hematology and Oncology, Hematology    Contact information:    [address] [place] [address]  [phone]          Discharge Instructions     Future Orders    Activity as tolerated     Call MD for:  difficulty breathing or increased cough     Call MD for:  increased confusion or weakness     Call MD for:  persistent dizziness, light-headedness, or visual disturbances     Call MD for:  redness, tenderness, or signs of infection (pain, swelling, redness, odor or green/yellow discharge around incision site)     Call MD for:  temperature >100.4     Diet general     Questions:    Total calories:      Fat restriction, if any:      Protein  "restriction, if any:      Na restriction, if any:      Fluid restriction:      Additional restrictions:  Cardiac (Low Na/Chol)        Discharge Instructions       BENZONATATE CAPSULES    Discharge References/Attachments     BENZONATATE CAPSULES (ENGLISH)    MOXIFLOXACIN TABLETS (ENGLISH)    IRON TABLETS, CAPSULES, EXTENDED-RELEASE TABLETS (ENGLISH)    DOCUSATE CAPSULES (ENGLISH)    OSELTAMIVIR CAPSULES (ENGLISH)    PARTNER IP AVS PNEUMONIA DISCHARGE INSTRUCTIONS [facility]        Primary Diagnosis     Your primary diagnosis was:  Squamous Cell Carcinoma Of Lungs, Bilateral      Admission Information     Date & Time Provider Department CSN    2/3/2017 10:55 PM Manisha Lara MD [facility] [id number]      Care Providers     Provider Role Specialty Primary office phone    Manisha Lara MD Attending Provider Internal Medicine [phone]      Your Vitals Were     BP Pulse Temp Resp Height Weight    134/64 (Patient Position: Lying, BP Method: Automatic) 94 99.7 °F (37.6 °C) 20 5' 1" (1.549 m) 48.5 kg (106 lb 14.8 oz)    Last Period SpO2 BMI          (LMP Unknown) 96% 20.2 kg/m2        Recent Lab Values        4/26/2012 9/20/2015                        9:55 AM 11:54 PM          A1C 5.1 5.5                     Pending Labs     Order Current Status    Blood culture #1 Preliminary result    Blood culture #2 Preliminary result      Allergies as of 2/4/2017        Reactions    Codeine Other (See Comments)    "Made me nervous and shaky"      [facility]     [facility] Nurse Care Line - 24/7 Assistance  Unless otherwise directed by your provider, please contact [facility], our nurse care line that is available for 24/7 assistance.     Registered nurses in the [facility] provide clinical advisement, health education, appointment booking, and other advisory services.  Call for this free service at [phone].        Advance Directives     An advance directive is a document which, in the " event you are no longer able to make decisions for yourself, tells your healthcare team what kind of treatment you do or do not want to receive, or who you would like to make those decisions for you.  If you do not currently have an advance directive, [facility] encourages you to create one.  For more information call:  [phone]), [phone] ([phone]),  or log on to [email or web address].        Smoking Cessation     If you would like to quit smoking:   You may be eligible for free services if you are a [place] resident and started smoking cigarettes before September 1, 1988.  Call the [employer]oking [employer]ess[employer] ([employer]) toll free at (103) 895-8763 or (269) 128-1143.   Call 9-821-QUIT-NOW if you do not meet the above criteria.            Language Assistance Services     ATTENTION: Language assistance services are available, free of charge. Please call [phone].      ATENCIÓN: Si habla jhon, tiene a keith disposición servicios gratuitos de asistencia lingüística. Llame al [phone].     [facility] Ý: N?u b?n nói Ti?ng Vi?t, có các d?ch v? h? tr? ngôn ng? mi?n phí dành cho b?n. G?i s? [phone].        Pneumonmia Discharge Instructions                 [facility] complies with applicable Federal civil rights laws and does not discriminate on the basis of race, color, national origin, age, disability, or sex.

## 2017-02-04 VITALS
HEIGHT: 61 IN | RESPIRATION RATE: 20 BRPM | BODY MASS INDEX: 20.19 KG/M2 | DIASTOLIC BLOOD PRESSURE: 64 MMHG | WEIGHT: 106.94 LBS | SYSTOLIC BLOOD PRESSURE: 134 MMHG | TEMPERATURE: 100 F | HEART RATE: 94 BPM | OXYGEN SATURATION: 96 %

## 2017-02-04 PROBLEM — C34.92: Status: ACTIVE | Noted: 2017-02-04

## 2017-02-04 PROBLEM — C34.91: Status: ACTIVE | Noted: 2017-01-12

## 2017-02-04 PROBLEM — J06.9 VIRAL UPPER RESPIRATORY ILLNESS: Status: ACTIVE | Noted: 2017-02-04

## 2017-02-04 PROBLEM — J18.9 HOSPITAL-ACQUIRED PNEUMONIA: Status: RESOLVED | Noted: 2017-02-04 | Resolved: 2017-02-04

## 2017-02-04 PROBLEM — J18.9 HOSPITAL-ACQUIRED PNEUMONIA: Status: ACTIVE | Noted: 2017-02-04
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
## 2017-02-04 LAB
BILIRUB UR QL STRIP: NEGATIVE
CLARITY UR: CLEAR
COLOR UR: YELLOW
FERRITIN SERPL-MCNC: 9 NG/ML
FLUAV AG SPEC QL IA: NEGATIVE
FLUBV AG SPEC QL IA: NEGATIVE
FOLATE SERPL-MCNC: 12 NG/ML
GLUCOSE UR QL STRIP: NEGATIVE
HGB UR QL STRIP: NEGATIVE
INR PPP: 1.1
IRON SERPL-MCNC: 22 UG/DL
KETONES UR QL STRIP: NEGATIVE
LACTATE SERPL-SCNC: 1.1 MMOL/L
LEUKOCYTE ESTERASE UR QL STRIP: NEGATIVE
MAGNESIUM SERPL-MCNC: 1.5 MG/DL
NITRITE UR QL STRIP: NEGATIVE
PH UR STRIP: 8 [PH] (ref 5–8)
PROCALCITONIN SERPL IA-MCNC: <0.09 NG/ML
PROT UR QL STRIP: NEGATIVE
PROTHROMBIN TIME: 11.4 SEC
SATURATED IRON: 6 %
SP GR UR STRIP: 1.02 (ref 1–1.03)
SPECIMEN SOURCE: NORMAL
TOTAL IRON BINDING CAPACITY: 380 UG/DL
TRANSFERRIN SERPL-MCNC: 257 MG/DL
URN SPEC COLLECT METH UR: NORMAL
UROBILINOGEN UR STRIP-ACNC: NEGATIVE EU/DL
VIT B12 SERPL-MCNC: 411 PG/ML

## 2017-02-04 PROCEDURE — 87040 BLOOD CULTURE FOR BACTERIA: CPT

## 2017-02-04 PROCEDURE — 93005 ELECTROCARDIOGRAM TRACING: CPT

## 2017-02-04 PROCEDURE — 25000003 PHARM REV CODE 250: Performed by: INTERNAL MEDICINE

## 2017-02-04 PROCEDURE — 63600175 PHARM REV CODE 636 W HCPCS: Performed by: EMERGENCY MEDICINE

## 2017-02-04 PROCEDURE — 84145 PROCALCITONIN (PCT): CPT

## 2017-02-04 PROCEDURE — 25500020 PHARM REV CODE 255: Performed by: INTERNAL MEDICINE

## 2017-02-04 PROCEDURE — 83735 ASSAY OF MAGNESIUM: CPT

## 2017-02-04 PROCEDURE — G0378 HOSPITAL OBSERVATION PER HR: HCPCS

## 2017-02-04 PROCEDURE — 63600175 PHARM REV CODE 636 W HCPCS: Performed by: HOSPITALIST

## 2017-02-04 PROCEDURE — 93010 ELECTROCARDIOGRAM REPORT: CPT | Mod: ,,, | Performed by: INTERNAL MEDICINE

## 2017-02-04 PROCEDURE — 87400 INFLUENZA A/B EACH AG IA: CPT | Mod: 59

## 2017-02-04 PROCEDURE — 83605 ASSAY OF LACTIC ACID: CPT

## 2017-02-04 PROCEDURE — 25000003 PHARM REV CODE 250: Performed by: EMERGENCY MEDICINE

## 2017-02-04 PROCEDURE — 63600175 PHARM REV CODE 636 W HCPCS: Performed by: INTERNAL MEDICINE

## 2017-02-04 RX ORDER — CLOPIDOGREL BISULFATE 75 MG/1
75 TABLET ORAL DAILY
Status: DISCONTINUED | OUTPATIENT
Start: 2017-02-04 | End: 2017-02-04 | Stop reason: HOSPADM

## 2017-02-04 RX ORDER — POTASSIUM CHLORIDE 20 MEQ/1
40 TABLET, EXTENDED RELEASE ORAL ONCE
Status: COMPLETED | OUTPATIENT
Start: 2017-02-04 | End: 2017-02-04

## 2017-02-04 RX ORDER — AMLODIPINE AND BENAZEPRIL HYDROCHLORIDE 5; 20 MG/1; MG/1
1 CAPSULE ORAL DAILY
Status: DISCONTINUED | OUTPATIENT
Start: 2017-02-04 | End: 2017-02-04 | Stop reason: HOSPADM

## 2017-02-04 RX ORDER — BENZONATATE 100 MG/1
100 CAPSULE ORAL 3 TIMES DAILY PRN
Qty: 15 CAPSULE | Refills: 1 | Status: SHIPPED | OUTPATIENT
Start: 2017-02-04 | End: 2017-02-09

## 2017-02-04 RX ORDER — FERROUS SULFATE 325(65) MG
325 TABLET, DELAYED RELEASE (ENTERIC COATED) ORAL 2 TIMES DAILY WITH MEALS
Qty: 60 TABLET | Refills: 2 | Status: SHIPPED | OUTPATIENT
Start: 2017-02-04 | End: 2017-04-03

## 2017-02-04 RX ORDER — MOXIFLOXACIN HYDROCHLORIDE 400 MG/1
400 TABLET ORAL DAILY
Qty: 5 TABLET | Refills: 0 | Status: SHIPPED | OUTPATIENT
Start: 2017-02-04 | End: 2017-02-09

## 2017-02-04 RX ORDER — OSELTAMIVIR PHOSPHATE 75 MG/1
75 CAPSULE ORAL 2 TIMES DAILY
Status: DISCONTINUED | OUTPATIENT
Start: 2017-02-04 | End: 2017-02-04 | Stop reason: DRUGHIGH

## 2017-02-04 RX ORDER — ENOXAPARIN SODIUM 100 MG/ML
40 INJECTION SUBCUTANEOUS EVERY 24 HOURS
Status: DISCONTINUED | OUTPATIENT
Start: 2017-02-04 | End: 2017-02-04 | Stop reason: HOSPADM

## 2017-02-04 RX ORDER — OSELTAMIVIR PHOSPHATE 30 MG/1
30 CAPSULE ORAL EVERY 12 HOURS
Qty: 9 CAPSULE | Refills: 0 | Status: SHIPPED | OUTPATIENT
Start: 2017-02-04 | End: 2017-02-09

## 2017-02-04 RX ORDER — OSELTAMIVIR PHOSPHATE 30 MG/1
30 CAPSULE ORAL 2 TIMES DAILY
Status: DISCONTINUED | OUTPATIENT
Start: 2017-02-04 | End: 2017-02-04 | Stop reason: HOSPADM

## 2017-02-04 RX ORDER — ACETAMINOPHEN 325 MG/1
650 TABLET ORAL EVERY 6 HOURS PRN
Status: DISCONTINUED | OUTPATIENT
Start: 2017-02-04 | End: 2017-02-04 | Stop reason: HOSPADM

## 2017-02-04 RX ORDER — MOXIFLOXACIN HYDROCHLORIDE 400 MG/1
400 TABLET ORAL DAILY
Status: DISCONTINUED | OUTPATIENT
Start: 2017-02-04 | End: 2017-02-04 | Stop reason: HOSPADM

## 2017-02-04 RX ORDER — ATORVASTATIN CALCIUM 10 MG/1
10 TABLET, FILM COATED ORAL DAILY
Status: DISCONTINUED | OUTPATIENT
Start: 2017-02-04 | End: 2017-02-04 | Stop reason: HOSPADM

## 2017-02-04 RX ORDER — ONDANSETRON 4 MG/1
4 TABLET, ORALLY DISINTEGRATING ORAL EVERY 8 HOURS PRN
Status: DISCONTINUED | OUTPATIENT
Start: 2017-02-04 | End: 2017-02-04 | Stop reason: HOSPADM

## 2017-02-04 RX ORDER — ASPIRIN 81 MG/1
81 TABLET ORAL DAILY
Status: DISCONTINUED | OUTPATIENT
Start: 2017-02-04 | End: 2017-02-04 | Stop reason: HOSPADM

## 2017-02-04 RX ORDER — CARVEDILOL 12.5 MG/1
12.5 TABLET ORAL 2 TIMES DAILY WITH MEALS
Status: DISCONTINUED | OUTPATIENT
Start: 2017-02-04 | End: 2017-02-04 | Stop reason: HOSPADM

## 2017-02-04 RX ORDER — MAGNESIUM SULFATE HEPTAHYDRATE 40 MG/ML
2 INJECTION, SOLUTION INTRAVENOUS ONCE
Status: COMPLETED | OUTPATIENT
Start: 2017-02-04 | End: 2017-02-04

## 2017-02-04 RX ORDER — DOCUSATE SODIUM 100 MG/1
100 CAPSULE, LIQUID FILLED ORAL 2 TIMES DAILY
Qty: 60 CAPSULE | Refills: 2 | Status: SHIPPED | OUTPATIENT
Start: 2017-02-04 | End: 2017-04-24 | Stop reason: ALTCHOICE

## 2017-02-04 RX ADMIN — AMLODIPINE AND BENAZEPRIL HYDROCHLORIDE 1 CAPSULE: 5; 20 CAPSULE ORAL at 10:02

## 2017-02-04 RX ADMIN — CARVEDILOL 12.5 MG: 12.5 TABLET, FILM COATED ORAL at 08:02

## 2017-02-04 RX ADMIN — POTASSIUM CHLORIDE 40 MEQ: 20 TABLET, EXTENDED RELEASE ORAL at 10:02

## 2017-02-04 RX ADMIN — MOXIFLOXACIN HYDROCHLORIDE 400 MG: 400 TABLET, FILM COATED ORAL at 01:02

## 2017-02-04 RX ADMIN — CLOPIDOGREL BISULFATE 75 MG: 75 TABLET ORAL at 10:02

## 2017-02-04 RX ADMIN — ACETAMINOPHEN 650 MG: 325 TABLET ORAL at 01:02

## 2017-02-04 RX ADMIN — ASPIRIN 81 MG: 81 TABLET, COATED ORAL at 10:02

## 2017-02-04 RX ADMIN — IOHEXOL 75 ML: 350 INJECTION, SOLUTION INTRAVENOUS at 12:02

## 2017-02-04 RX ADMIN — ATORVASTATIN CALCIUM 10 MG: 10 TABLET, FILM COATED ORAL at 10:02

## 2017-02-04 RX ADMIN — MAGNESIUM SULFATE IN WATER 2 G: 40 INJECTION, SOLUTION INTRAVENOUS at 07:02

## 2017-02-04 RX ADMIN — PIPERACILLIN SODIUM AND TAZOBACTAM SODIUM 4.5 G: 4; .5 INJECTION, POWDER, LYOPHILIZED, FOR SOLUTION INTRAVENOUS at 02:02

## 2017-02-04 RX ADMIN — OSELTAMIVIR PHOSPHATE 30 MG: 30 CAPSULE ORAL at 05:02

## 2017-02-04 NOTE — H&P
"[facility] History and Physical - Resident Note    Admitting Team: Team A  Attending Physician: Jane  Resident: Maral   Interns: Tina    Date of Admit: 2/3/2017    Chief Complaint     Nausea (arrived per EMS reports nausea and weakness for 2 hours PTA. ) and Weakness      Subjective:      History of Present Illness:  Andree Quiroz is a 66 y.o. female who  has a past medical history of Acute myocardial infarction of anterior wall (04/26/2012); Coronary arteriosclerosis; Depression; Disorder of vein; Hyperlipidemia; Hypertension; Insomnia; Lateral epicondylitis; Lung cancer; Mammogram abnormal; Osteoarthritis; and Pityriasis versicolor. The patient presented to [facility] on 2/3/2017 with a primary complaint of increased cough, lightheadedness/dizziness, nausea, and malaise.   She was in her usual state of health until the evening of 2/3/17, which is able to perform all ALDs but sometimes experiencing dizziness when she exerts herself. Yesterday evening she was lying in bed and started vigorously coughing due to "feeling a tickle in her throat". Afterward she felt dizzy and lightheaded, and started experiencing nausea. She says her cough has only been productive of scant white sputum. She has also been experiencing chills, but this is not uncommon for her she says. She denies any asscociated fever, shortness of breath, CP, hemoptysis, purulent or foul-smelling sputum, emesis, LOC, falls, and body aches. She has recently been in contact with a friend that was swab positive for influenza approximately 3-4 days ago.     She is a patient of Dr. 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 She initiated chemo/XRT after diagnosis diagnosis. Last chemo with carboplatin/gemcitabine (2nd line) on 12/13/16. Last XRT in 2015. Progression of disease on last CT chest on 1/11/17. Discussions at last onc clinic visit of initiating 3rd line " "immunoterapy (KEYTRUDA vs Opdivo) given some PDL1 expression, but patient wanted to wait 3 weeks to start new treatment.    Past Medical History:  Past Medical History   Diagnosis Date    Acute myocardial infarction of anterior wall 04/26/2012     2-stents 04/26/12 and 1 setnt 07/24/12. - seen by Dr. Lopez    Coronary arteriosclerosis      was with John (retired) and now with Bhavya    Depression     Disorder of vein     Hyperlipidemia     Hypertension     Insomnia     Lateral epicondylitis     Lung cancer      Treated by Dr. Sandra    Mammogram abnormal     Osteoarthritis     Pityriasis versicolor        Past Surgical History:  Past Surgical History   Procedure Laterality Date    Hysterectomy  1979     partial unsure when last PAP was    Coronary angioplasty  04/2012     x3 stents       Allergies:  Review of patient's allergies indicates:   Allergen Reactions    Codeine Other (See Comments)     "Made me nervous and shaky"       Home Medications:  Prior to Admission medications    Medication Sig Start Date End Date Taking? Authorizing Provider   amlodipine-benazepril 5-20 mg (LOTREL) 5-20 mg per capsule TAKE 1 CAPSULE BY MOUTH ONCE DAILY. 1/2/17  Yes Bertha Sherwood NP   aspirin (ECOTRIN) 81 MG EC tablet Take 81 mg by mouth once daily.   Yes Historical Provider, MD   atorvastatin (LIPITOR) 20 MG tablet TAKE 1/2 TABLET AT BEDTIME 11/11/16  Yes Jaime Latif MD   carvedilol (COREG) 12.5 MG tablet Take 1 tablet (12.5 mg total) by mouth 2 (two) times daily with meals. 1/12/17 1/12/18 Yes Jaime Latif MD   clopidogrel (PLAVIX) 75 mg tablet Take 1 tablet (75 mg total) by mouth once daily. 9/7/16  Yes Jaime Latif MD   lactulose (CHRONULAC) 10 gram/15 mL solution Take by mouth 3 (three) times daily. TAKES AS NEEDED FOR CONSTIPATION   Yes Historical Provider, MD   ondansetron (ZOFRAN) 4 MG tablet Take 1 tablet (4 mg total) by mouth every 8 (eight) hours as needed. 8/25/16  " "Yes Tuan Pat MD       Family History:  Family History   Problem Relation Age of Onset    Heart disease Father      heart attack    Hypertension Mother        Social History:  Social History   Substance Use Topics    Smoking status: Current Some Day Smoker     Packs/day: 0.10     Years: 30.00    Smokeless tobacco: None      Comment: Smoked 1/2 ppd for over 20 years. Quit in . Recently started again () smoking one-two from time to time    Alcohol use No       Review of Systems:  Constitutional: positive for chills and malaise  Eyes: negative  Ears, nose, mouth, throat, and face: negative  Respiratory: positive for cough and sputum  Cardiovascular: negative  Gastrointestinal: positive for diarrhea (1 episode)  Integument/breast: negative  Hematologic/lymphatic: negative  Musculoskeletal:positive for arthralgias (right knee)  Neurological: positive for dizziness   All other systems are reviewed and are negative.    Health Maintaince :   Primary Care Physician: JUSTIN Sherwood  Immunizations:   TDap is up to date.  Influenza is not up to date.  Pneumovax is up to date.  Cancer Screening:  PAP: is not up to date  Mammogram: is not up to date.  Colonoscopy: is not up to date.      Objective:   Last 24 Hour Vital Signs:  BP  Min: 125/60  Max: 135/87  Temp  Av.2 °F (36.8 °C)  Min: 98.2 °F (36.8 °C)  Max: 98.2 °F (36.8 °C)  Pulse  Av.3  Min: 92  Max: 96  Resp  Av  Min: 20  Max: 20  SpO2  Av %  Min: 98 %  Max: 98 %  Height  Av' 1" (154.9 cm)  Min: 5' 1" (154.9 cm)  Max: 5' 1" (154.9 cm)  Weight  Av.7 kg (103 lb)  Min: 46.7 kg (103 lb)  Max: 46.7 kg (103 lb)  Body mass index is 19.46 kg/(m^2).       Physical Examination:    Visit Vitals    /63    Pulse 95    Temp 99.3 °F (37.4 °C) (Oral)    Resp 18    Ht 5' 1" (1.549 m)    Wt 46.7 kg (103 lb)    LMP  (LMP Unknown)    SpO2 95%    BMI 19.46 kg/m2       General Appearance:    Alert, cooperative, no distress, appears " stated age   Head:    Normocephalic, without obvious abnormality, atraumatic   Eyes:    PERRL, conjunctiva/corneas clear, EOM's intact   Throat:   MMM, lips, mucosa, and tongue normal; edentulous, no lesions    Neck:   Supple, symmetrical, trachea midline, no adenopathy;     thyroid:  no enlargement/tenderness/nodules; no carotid    bruit or JVD   Back:     Symmetric, no curvature, ROM normal, no CVA tenderness   Lungs:     Clear to auscultation bilaterally, respirations unlabored   Chest Wall:    Port device over right chest, no erythema or fluctuance, no tenderness    Heart:    Regular rate and rhythm, S1 and S2 normal, no murmur   Abdomen:     Soft, non-tender, bowel sounds active all four quadrants,     no masses, no organomegaly   Extremities:   Extremities normal, atraumatic, no cyanosis or edema   Pulses:   2+ and symmetric all extremities   Skin:   Skin color, texture, turgor normal, no rashes or lesions   Lymph nodes:   No LAD appreciated   Neurologic:   CNII-XII intact, normal strength, sensation and reflexes     throughout       Laboratory:  Most Recent Data:  CBC: Lab Results   Component Value Date    WBC 5.46 02/03/2017    HGB 8.2 (L) 02/03/2017    HCT 26.5 (L) 02/03/2017     02/03/2017    MCV 80 (L) 02/03/2017    RDW 18.6 (H) 02/03/2017     WBC Differential: 78 % N, 0 % Bands, 7.5 % L, 12.5 % M, 1.8 % Eo, 0.2 % Baso, 0 additional cells seen  BMP: Lab Results   Component Value Date     02/03/2017    K 3.3 (L) 02/03/2017     02/03/2017    CO2 21 (L) 02/03/2017    BUN 8 02/03/2017    CREATININE 0.7 02/03/2017     (H) 02/03/2017    CALCIUM 8.2 (L) 02/03/2017    MG 1.7 01/11/2017    PHOS 3.1 01/11/2017     LFTs: Lab Results   Component Value Date    PROT 6.2 02/03/2017    ALBUMIN 3.0 (L) 02/03/2017    BILITOT 0.3 02/03/2017    AST 11 02/03/2017    ALKPHOS 100 02/03/2017    ALT 7 (L) 02/03/2017     Coags:   Lab Results   Component Value Date    INR 1.1 02/03/2017     FLP: Lab  Results   Component Value Date    CHOL 123 09/21/2015    HDL 39 (L) 09/21/2015    LDLCALC 73.0 09/21/2015    TRIG 55 09/21/2015    CHOLHDL 31.7 09/21/2015     DM: Lab Results   Component Value Date    HGBA1C 5.5 09/20/2015    HGBA1C 5.1 04/26/2012    LDLCALC 73.0 09/21/2015    CREATININE 0.7 02/03/2017     Thyroid: Lab Results   Component Value Date    TSH 0.705 09/21/2015     Anemia: Lab Results   Component Value Date    IRON 79 10/12/2016    TIBC 414 10/12/2016    FERRITIN 80 10/12/2016    NNVJFOXI64 217 09/20/2015    FOLATE 8.6 09/20/2015     Cardiac: Lab Results   Component Value Date    TROPONINI <0.006 02/03/2017    BNP 82 02/03/2017     Urinalysis: Lab Results   Component Value Date    COLORU Yellow 02/03/2017    SPECGRAV 1.020 02/03/2017    NITRITE Negative 02/03/2017    KETONESU Negative 02/03/2017    UROBILINOGEN Negative 02/03/2017       Trended Lab Data:    Recent Labs  Lab 02/03/17  2319   WBC 5.46   HGB 8.2*   HCT 26.5*      MCV 80*   RDW 18.6*      K 3.3*      CO2 21*   BUN 8   CREATININE 0.7   *   PROT 6.2   ALBUMIN 3.0*   BILITOT 0.3   AST 11   ALKPHOS 100   ALT 7*       Trended Cardiac Data:    Recent Labs  Lab 02/03/17  2319   TROPONINI <0.006   BNP 82       Microbiology Data:  BCx x 2 (2/4/17): pending    Radiology:  Imaging Results         X-Ray Chest AP Portable (Final result) Result time:  02/04/17 00:27:19    Final result by Don Lew MD (02/04/17 00:27:19)    Impression:       New airspace in the right upper lung zone superimposed on old nodular densities.  The findings are concerning for infectious etiology.  Worsening metastatic disease is also not excluded Followup is suggested.              Electronically signed by: DON LEW MD  Date:     02/04/17  Time:    00:27     Narrative:    Exam: [id number]  02/03/17  23:52:55 [id number] ([facility]) : XR CHEST AP PORTABLE    Technique:    Single frontal chest x-ray.    Comparison:    08/25/2016.    Findings:      There is a  stable appearance of the right-sided chest port the tip in the SVC.  The trachea is unremarkable.  There are stable fibrotic changes involving the paramedian aspect of the superior mediastinum.  There are calcifications of the aortic knob.  Coronary artery calcifications are also present.  The cardiac silhouette is within normal limits.  The right hemidiaphragm is unremarkable.  There is stable obscuration of the left hemidiaphragm.  There is no evidence of a pneumothorax.  There are new airspace opacities in the right upper lobe.  These are superimposed on previous nodular in the bilateral lung fields.  There are degenerative changes in the osseous structures.               Assessment:     Andree Quiroz is a 66 y.o. female with:  Patient Active Problem List    Diagnosis Date Noted    Stage IV squamous cell carcinoma of left lung 01/12/2017    Encounter for antineoplastic chemotherapy 07/27/2016    Prophylactic measure 07/27/2016    Malignant neoplasm metastatic to left lung 07/26/2016    Constipation - functional 11/16/2015    Old MI (myocardial infarction) 11/10/2015    S/P PTCA (percutaneous transluminal coronary angioplasty) 11/10/2015    Coronary artery disease involving native coronary artery of native heart without angina pectoris 11/10/2015    Hypertension     Lateral epicondylitis     Pityriasis versicolor     Insomnia     Osteoarthritis     Essential hypertension 09/22/2015    B12 deficiency 09/22/2015    Iron deficiency anemia due to chronic blood loss 09/21/2015    Tobacco abuse 09/21/2015    Hyperlipidemia 12/18/2014        Plan:     URI with New Upper Airway Opacity  - Productive cough of white sputum x 1 day, malaise, lightheadedness, +sick contact with flu  - Afebrile, normotensive, not tachycardic, no leukocytosis  - CXR with new right upper lung zone opacity super imposed over old bilateral lung zone opacities in the setting of known and progressing lung malignancy.  Opacity not present on most recent CXR on 8/25/16. New nodules on CT chest 1/11/17. Will discuss utility of another CT chest given know progression of malignancy, and atypical presentation for bacterial PNA.   - Empiric bacterial pneumonia coverage with vancomycin and zosyn for now. BCx collected.  - Oseltamivir for possible flu given sick contact; flu swab ordered  - Procalcitonin pending; can deescalate antimicrobials as micro/laboratory data returns    Stage IIIa Squamous Cell Carcinoma of Lung   - Patient of Dr. Sandra; diagnosed 9/2015. She initiated chemo/XRT in 2015. Last chemo with carboplatin/gemcitabine (2nd line) on 12/13/16. Last XRT in 2015.   - Progression of disease on last CT chest on 1/11/17. Enlargement of bilateral pulmonary nodules  - Discussions at last onc clinic visit of initiating 3rd line immunoterapy (KEYTRUDA vs Opdivo) given some PDL1 expression, but patient wanted to wait 3 weeks to start new treatment.  - Will notify Dr. Sandra   - Possibility that her symptoms could be attributed to progression of disease    Microcytic Anemia  - H/H on admit 8.2/26.5; MCV 80   - Iron studies performed 10/2016 unremarkable: ferritin 80 (n), transferrin 280 (n), TIBC 414 (n), saturated iron 19% (borderline low). Hemoglobin was 9.1 at that time. Per clinic visit with Dr. Sandra in October, initiation of Procrit was discussed if Hb<9  - Most recent chemo 12/13/16  - Ferritin today is 9; Iron, TIBC, B12 and folate pending    HTN  - Will continue home antihypertensives  - Currently normotensive  - Will continue to monitor    CAD  - MI in 2012; s/p stent placement in 2012 as well  - No CP today  - Continue ASA, Plavix, and statin  - Dr. Latif is her cardiologist    Ppx: Lovenox  F: none at this time  E: replace PRN  N/GI: Cardiac Diet; PRN zofran    Dispo: Admit   Code Status:     Full    Bradley A [place]  [facility] Internal Medicine HO-I  [facility] Internal Medicine Service    [facility] Medicine Hospitalist Pager numbers:    [facility] Hospitalist Medicine Team A (Jane/Kyler): [phone]  [facility] Hospitalist Medicine Team B (Sandrine/Trace):  [phone]

## 2017-02-04 NOTE — ED NOTES
Pt presents to ed with c/o nausea and weakness appx 3 hours pta. Denies actual vomit, only heaving and saliva. Reports dry cough for appx 7 days that leads to nausea. Pt is aaox4, neuro intact. Son at bedside.

## 2017-02-04 NOTE — PLAN OF CARE
Problem: Patient Care Overview  Goal: Plan of Care Review  Outcome: Ongoing (interventions implemented as appropriate)  Patient remains free from falls ambulated with assistance to the bathroom . Zosyn infusing from ED no problems noted. Patient has a diarrhea stool in the ER and a normal one at home. Noted with a dry cough. No problems noted.  Goal: Individualization & Mutuality  Outcome: Ongoing (interventions implemented as appropriate)

## 2017-02-04 NOTE — PLAN OF CARE
Discharge orders noted. No HH or DME.    Future Appointments  Date Time Provider Department Center   3/10/2017 10:00 AM APPOINTMENT LAB, [facility]   3/13/2017 1:00 PM Saul Sandra MD [facility] HEM ONC [facility]   3/13/2017 1:30 PM CHAIR 04 [facility] CHEMO [facility]   3/31/2017 10:00 AM HOSPITAL LAB, [facility]   4/3/2017 10:00 AM Saul Sandra MD [facility] HEM [facility]   5/16/2017 10:30 AM Jaime Latfi [facility]          02/04/17 1225   Final Note   Assessment Type Final Discharge Note   Discharge Disposition Home   What phone number can be called within the next 1-3 days to see how you are doing after discharge? [phone]   Hospital Follow Up  Appt(s) scheduled? No  (offices closed on weekend, [place] to call with follow up)   Offered [facility] -- Bedside Delivery? n/a  (not available on weekend)   Discharge/Hospital Encounter Summary to (non-[facility]) PCP Yes   Referral to Outpatient Case Management complete? n/a   Referral to / orders for Home Health Complete? n/a   30 day supply of medicines given at discharge, if documented non-compliance / non-adherence? n/a   Any social issues identified prior to discharge? n/a   Did you assess the readiness or willingness of the family or caregiver to support self management of care? n/a   Right Care Referral Info   Post Acute Recommendation No Care     Zulema Frias RN Transitional Navigator  [phone]

## 2017-02-04 NOTE — ED PROVIDER NOTES
"Encounter Date: 2/3/2017       History     Chief Complaint   Patient presents with    Nausea     arrived per EMS reports nausea and weakness for 2 hours PTA.     Weakness     Review of patient's allergies indicates:   Allergen Reactions    Codeine Other (See Comments)     "Made me nervous and shaky"     HPI Comments: Andree Quiroz, a 66 y.o. female, complains of episode of weakness and nausea that began with paroxysmal coughing.  She said the cough is nonproductive.  She said she became weak and nauseated.  She has a history of carcinoma of the lung.  She received chemotherapy in September and is due to start another cycle next month.  She denies any fevers chills or sweats.  She denies any chest pain but says she has a history of coronary artery disease.  She received 4 mg of Zofran IV per EMS and no longer feels nauseated.  Pain location: No reported pain         Past Medical History   Diagnosis Date    Acute myocardial infarction of anterior wall 04/26/2012     2-stents 04/26/12 and 1 setnt 07/24/12. - seen by Dr. Lopez    Coronary arteriosclerosis      was with John (retired) and now with Bhavya    Depression     Disorder of vein     Hyperlipidemia     Hypertension     Insomnia     Lateral epicondylitis     Lung cancer      Treated by Dr. Sandra    Mammogram abnormal     Osteoarthritis     Pityriasis versicolor      No past medical history pertinent negatives.  Past Surgical History   Procedure Laterality Date    Hysterectomy  1979     partial unsure when last PAP was    Coronary angioplasty  04/2012     x3 stents     Family History   Problem Relation Age of Onset    Heart disease Father      heart attack    Hypertension Mother      Social History   Substance Use Topics    Smoking status: Current Some Day Smoker     Packs/day: 0.10     Years: 30.00    Smokeless tobacco: None      Comment: Smoked 1/2 ppd for over 20 years. Quit in 2012. Recently started again (2014) " smoking one-two from time to time    Alcohol use No     Review of Systems   Constitutional: Positive for fatigue.   HENT: Negative.    Respiratory: Positive for cough.    Gastrointestinal: Positive for nausea.   Neurological: Positive for weakness.   All other systems reviewed and are negative.      Physical Exam   Initial Vitals   BP Pulse Resp Temp SpO2   02/03/17 2257 02/03/17 2257 02/03/17 2257 02/03/17 2257 02/03/17 2257   129/58 92 20 98.2 °F (36.8 °C) 98 %     Physical Exam    Nursing note and vitals reviewed.  Constitutional: She appears well-developed and well-nourished. No distress.   Eyes: Conjunctivae and EOM are normal. Pupils are equal, round, and reactive to light.   Neck: Normal range of motion. Neck supple.   Cardiovascular: Normal rate, regular rhythm and normal heart sounds.   Pulmonary/Chest: Breath sounds normal. No respiratory distress.   Abdominal: Soft. There is no tenderness. There is no rebound and no guarding.   Musculoskeletal: Normal range of motion.   Neurological: She is alert and oriented to person, place, and time. She has normal strength.   Skin: Skin is warm and dry.   Psychiatric: She has a normal mood and affect. Her behavior is normal. Thought content normal.         ED Course   Procedures  Labs Reviewed   TROPONIN I   PROTIME-INR   CBC W/ AUTO DIFFERENTIAL   COMPREHENSIVE METABOLIC PANEL   B-TYPE NATRIURETIC PEPTIDE   URINALYSIS   POCT GLUCOSE MONITORING CONTINUOUS             Medical Decision Making:   Initial Assessment:   66 y.o. female, complains of episode of weakness and nausea that began with paroxysmal coughing.  She said the cough is nonproductive.  She said she became weak and nauseated.  She has a history of carcinoma of the lung.  She received chemotherapy in September and is due to start another cycle next month.  She denies any fevers chills or sweats.  She denies any shortness of breath.  She denies any chest pain but says she has a history of coronary artery  disease.  She received 4 mg of Zofran IV per EMS and no longer feels nauseated.  Pain location: No reported pain  Differential Diagnosis:   Cardiac, PE, pneumonia, worsening lung cancer,   ED Management:  New infiltrates noted in Rt. Lung concern for pneumonia in patient with recent chemotherapy. Consulted [facility] Medicine for further evaluation and admission for nosocomial acquired pneumonia.  Zosyn initiated in ED.                   ED Course   Value Comment By Time   Hematocrit: (!) 26.5 (Reviewed) Pal Reinoso Jr., MD 02/04 0125    New infiltrates in Rt. Lung.  Consult [facility] for admission for pneumonia. Pal Reinoso Jr., MD 02/04 2112     Clinical Impression:   The primary encounter diagnosis was Hospital-acquired pneumonia. Diagnoses of Weakness and Stage IV squamous cell carcinoma of left lung were also pertinent to this visit.          Pal Reinoso Jr., MD  02/04/17 5218

## 2017-02-04 NOTE — PLAN OF CARE
Problem: Patient Care Overview  Goal: Plan of Care Review  Outcome: Ongoing (interventions implemented as appropriate)  Discharged   Medical team discussed discharge, current and anticipated plan of care with pt    No diarrhea reported   Temperature reduced to under 100 at time of discharge and headache diminished   Iv access removed   Prescriptions given   Instructions reviewed with pt and son using teach back method   No additional questions    Awaiting transport arrival

## 2017-02-05 NOTE — DISCHARGE SUMMARY
"[facility] Discharge Summary    Primary Team: [facility] Team A  Attending Physician: Jane  Resident: Maral  Intern: Winston    Date of Admit: 2/3/2017  Date of Discharge: 2/4/2017    Discharge to: Home  Condition: Fair    Discharge Diagnoses     Patient Active Problem List   Diagnosis    Hyperlipidemia    Microcytic anemia    Tobacco abuse    Hypertension    Lateral epicondylitis    Pityriasis versicolor    Insomnia    Osteoarthritis    Old MI (myocardial infarction)    S/P PTCA (percutaneous transluminal coronary angioplasty)    Coronary artery disease involving native coronary artery of native heart without angina pectoris    Malignant neoplasm metastatic to left lung    Squamous cell carcinoma of lungs, bilateral    Viral upper respiratory illness    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 female who  has a past medical history of Acute myocardial infarction of anterior wall (04/26/2012); Coronary arteriosclerosis; Depression; Disorder of vein; Hyperlipidemia; Hypertension; Insomnia; Lateral epicondylitis; Lung cancer; Mammogram abnormal; Osteoarthritis; and Pityriasis versicolor. The patient presented to [facility] on 2/3/2017 with a primary complaint of increased cough, lightheadedness/dizziness, nausea, and malaise.   She was in her usual state of health until the evening of 2/3/17, which is able to perform all ALDs but sometimes experiencing dizziness when she exerts herself. Yesterday evening she was lying in bed and started vigorously coughing due to "feeling a tickle in her throat". Afterward she felt dizzy and lightheaded, and started experiencing nausea. She says her cough has only been productive of scant white sputum. She has also been experiencing chills, but this is not uncommon for her she says. She " denies any asscociated fever, shortness of breath, CP, hemoptysis, purulent or foul-smelling sputum, emesis, LOC, falls, and body aches. She has recently been in contact with a friend that was swab positive for influenza approximately 3-4 days ago.      She is a patient of Dr. 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 She initiated chemo/XRT after diagnosis diagnosis. Last chemo with carboplatin/gemcitabine (2nd line) on 12/13/16. Last XRT in 2015. Progression of disease on last CT chest on 1/11/17. Discussions at last onc clinic visit of initiating 3rd line immunoterapy (KEYTRUDA vs Opdivo) given some PDL1 expression, but patient wanted to wait 3 weeks to start new treatment.    For the full HPI please refer to the History & Physical from this admission.    Hospital Course By Problem with Pertinent Findings     URI with New Upper Airway Opacity  - Productive cough of white sputum x 1 day, malaise, lightheadedness, +sick contact with flu  - Afebrile, normotensive, not tachycardic, no leukocytosis  - CXR with new right upper lung zone opacity super imposed over old bilateral lung zone opacities in the setting of known and progressing lung malignancy. Opacity not present on most recent CXR on 8/25/16. New nodules on CT chest 1/11/17.  - CT chest performed for evaluation - stable appearance of malignant disease, opacity possibly due to chemo port  - Discharge home on oseltamivir for possible flu given sick contact and empiric bacterial pneumonia coverage with moxifloxacin     Stage IIIa Squamous Cell Carcinoma of Lung   - Patient of Dr. Sandra; diagnosed 9/2015. She initiated chemo/XRT in 2015. Last chemo with carboplatin/gemcitabine (2nd line) on 12/13/16. Last XRT in 2015.   - Progression of disease on last CT chest on 1/11/17. Enlargement of bilateral pulmonary nodules  - Discussions at last onc clinic visit of initiating 3rd line immunoterapy (KEYTRUDA vs Opdivo) given some PDL1  expression, but patient wanted to wait 3 weeks to start new treatment.  - Dr. Sandra notified of patient's admission, will move up clinic appointment for close outpatient follow-up     Microcytic Anemia  - H/H on admit 8.2/26.5; MCV 80   - Iron studies performed 10/2016 unremarkable: ferritin 80 (n), transferrin 280 (n), TIBC 414 (n), saturated iron 19% (borderline low). Hemoglobin was 9.1 at that time. Per clinic visit with Dr. Sandra in October, initiation of Procrit was discussed if Hb<9  - Most recent chemo 12/13/16  - Iron studies c/w DARÍO, start colace and iron at discharge     HTN  - Will continue home antihypertensives     CAD  - MI in 2012; s/p stent placement in 2012 as well  - Continue ASA, Plavix, and statin  - Dr. Latif is her cardiologist - follow-up outpatient    Discharge Medications        Medication List      START taking these medications          benzonatate 100 MG capsule   Commonly known as:  TESSALON   Take 1 capsule (100 mg total) by mouth 3 (three) times daily as needed for Cough.       docusate sodium 100 MG capsule   Commonly known as:  COLACE   Take 1 capsule (100 mg total) by mouth 2 (two) times daily.       ferrous sulfate 325 (65 FE) MG EC tablet   Take 1 tablet (325 mg total) by mouth 2 (two) times daily with meals.       moxifloxacin 400 mg tablet   Commonly known as:  AVELOX   Take 1 tablet (400 mg total) by mouth once daily.       oseltamivir 30 MG capsule   Commonly known as:  TAMIFLU   Take 1 capsule (30 mg total) by mouth every 12 (twelve) hours.         CONTINUE taking these medications          amlodipine-benazepril 5-20 mg 5-20 mg per capsule   Commonly known as:  LOTREL   TAKE 1 CAPSULE BY MOUTH ONCE DAILY.       aspirin 81 MG EC tablet   Commonly known as:  ECOTRIN       atorvastatin 20 MG tablet   Commonly known as:  LIPITOR   TAKE 1/2 TABLET AT BEDTIME       carvedilol 12.5 MG tablet   Commonly known as:  COREG   Take 1 tablet (12.5 mg total) by mouth 2 (two) times daily  with meals.       clopidogrel 75 mg tablet   Commonly known as:  PLAVIX   Take 1 tablet (75 mg total) by mouth once daily.       lactulose 10 gram/15 mL solution   Commonly known as:  CHRONULAC       ondansetron 4 MG tablet   Commonly known as:  ZOFRAN   Take 1 tablet (4 mg total) by mouth every 8 (eight) hours as needed.            Where to Get Your Medications      You can get these medications from any pharmacy     Bring a paper prescription for each of these medications     benzonatate 100 MG capsule    docusate sodium 100 MG capsule    ferrous sulfate 325 (65 FE) MG EC tablet    moxifloxacin 400 mg tablet    oseltamivir 30 MG capsule             Discharge Information:   Diet:  Cardiac    Physical Activity:  As tolerated    Instructions:  1. Take all medications as prescribed  2. Keep all follow-up appointments  3. Return to the hospital or call your primary care physicians if any worsening symptoms such as fevers, chills, worsening cough or shortness of breath, chest pain or any other concerning symptoms occur.    Follow-Up Appointments:  F/u with PCP in 1 week, move up appointment with Dr. Sandra for 1 week, and Dr. Latif as scheduled.     Myra Alicia  \[facility]\"" Internal Medicine, -[facility]

## 2017-02-06 NOTE — PROGRESS NOTES
SSC made patient's hospital follow up appt with JUSTIN Sherwood 2/9/17 @ 1:20pm.  SSC placed call to patient and informed her of follow up appt. Pt verbally agreed.

## 2017-02-08 ENCOUNTER — TELEPHONE (OUTPATIENT)
Dept: FAMILY MEDICINE | Facility: CLINIC | Age: 67
End: 2017-02-08

## 2017-02-08 NOTE — TELEPHONE ENCOUNTER
----- Message from Ledy Snyder sent at 2/8/2017  9:09 AM CST -----  Contact: self, [phone]  Patient requests to speak with you regarding her insurance telling her she can no longer see a nurse practitioner. Patient states she would like to continue care with you. Please advise.

## 2017-02-08 NOTE — TELEPHONE ENCOUNTER
Pt stated that she needed to know who Bertha is under cause [facility] told her she had to find out what physician she is under, cause she wanted to continue to see Bertha.she said [place], I told her to try Dr.Monica Guerrero.

## 2017-02-09 LAB
BACTERIA BLD CULT: NORMAL
BACTERIA BLD CULT: NORMAL

## 2017-02-14 ENCOUNTER — HOSPITAL ENCOUNTER (EMERGENCY)
Facility: HOSPITAL | Age: 67
Discharge: HOME | End: 2017-02-14
Attending: EMERGENCY MEDICINE
Payer: MEDICARE

## 2017-02-14 VITALS
WEIGHT: 104 LBS | BODY MASS INDEX: 19.14 KG/M2 | DIASTOLIC BLOOD PRESSURE: 67 MMHG | SYSTOLIC BLOOD PRESSURE: 146 MMHG | HEART RATE: 87 BPM | RESPIRATION RATE: 18 BRPM | OXYGEN SATURATION: 96 % | TEMPERATURE: 96 F | HEIGHT: 62 IN

## 2017-02-14 DIAGNOSIS — R05.9 COUGH: ICD-10-CM

## 2017-02-14 DIAGNOSIS — R42 VERTIGO: Primary | ICD-10-CM

## 2017-02-14 DIAGNOSIS — N39.0 URINARY TRACT INFECTION WITHOUT HEMATURIA, SITE UNSPECIFIED: ICD-10-CM

## 2017-02-14 DIAGNOSIS — E86.0 DEHYDRATION: ICD-10-CM

## 2017-02-14 LAB
ALBUMIN SERPL BCP-MCNC: 3 G/DL
ALP SERPL-CCNC: 87 U/L
ALT SERPL W/O P-5'-P-CCNC: 11 U/L
ANION GAP SERPL CALC-SCNC: 13 MMOL/L
ANISOCYTOSIS BLD QL SMEAR: SLIGHT
AST SERPL-CCNC: 17 U/L
BACTERIA #/AREA URNS HPF: ABNORMAL /HPF
BASOPHILS # BLD AUTO: 0 K/UL
BASOPHILS NFR BLD: 0 %
BILIRUB SERPL-MCNC: 0.5 MG/DL
BILIRUB UR QL STRIP: NEGATIVE
BUN SERPL-MCNC: 17 MG/DL
CALCIUM SERPL-MCNC: 8.9 MG/DL
CHLORIDE SERPL-SCNC: 105 MMOL/L
CLARITY UR: CLEAR
CO2 SERPL-SCNC: 21 MMOL/L
COLOR UR: YELLOW
CREAT SERPL-MCNC: 0.8 MG/DL
DIFFERENTIAL METHOD: ABNORMAL
EOSINOPHIL # BLD AUTO: 0.1 K/UL
EOSINOPHIL NFR BLD: 1.7 %
ERYTHROCYTE [DISTWIDTH] IN BLOOD BY AUTOMATED COUNT: 18.6 %
EST. GFR  (AFRICAN AMERICAN): >60 ML/MIN/1.73 M^2
EST. GFR  (NON AFRICAN AMERICAN): >60 ML/MIN/1.73 M^2
GIANT PLATELETS BLD QL SMEAR: PRESENT
GLUCOSE SERPL-MCNC: 169 MG/DL
GLUCOSE UR QL STRIP: NEGATIVE
HCT VFR BLD AUTO: 30.2 %
HGB BLD-MCNC: 9.1 G/DL
HGB UR QL STRIP: NEGATIVE
KETONES UR QL STRIP: NEGATIVE
LEUKOCYTE ESTERASE UR QL STRIP: ABNORMAL
LYMPHOCYTES # BLD AUTO: 1.4 K/UL
LYMPHOCYTES NFR BLD: 25.4 %
MCH RBC QN AUTO: 23.4 PG
MCHC RBC AUTO-ENTMCNC: 30.1 %
MCV RBC AUTO: 78 FL
MICROSCOPIC COMMENT: ABNORMAL
MONOCYTES # BLD AUTO: 0.7 K/UL
MONOCYTES NFR BLD: 12.3 %
NEUTROPHILS # BLD AUTO: 3.3 K/UL
NEUTROPHILS NFR BLD: 60.6 %
NITRITE UR QL STRIP: NEGATIVE
PH UR STRIP: 6 [PH] (ref 5–8)
PLATELET # BLD AUTO: 261 K/UL
PMV BLD AUTO: 11.8 FL
POIKILOCYTOSIS BLD QL SMEAR: SLIGHT
POTASSIUM SERPL-SCNC: 3.4 MMOL/L
PROT SERPL-MCNC: 7.6 G/DL
PROT UR QL STRIP: ABNORMAL
RBC # BLD AUTO: 3.89 M/UL
RBC #/AREA URNS HPF: 0 /HPF (ref 0–4)
SODIUM SERPL-SCNC: 139 MMOL/L
SP GR UR STRIP: 1.02 (ref 1–1.03)
SQUAMOUS #/AREA URNS HPF: ABNORMAL /HPF
URN SPEC COLLECT METH UR: ABNORMAL
UROBILINOGEN UR STRIP-ACNC: ABNORMAL EU/DL
WBC # BLD AUTO: 5.43 K/UL
WBC #/AREA URNS HPF: 15 /HPF (ref 0–5)
YEAST URNS QL MICRO: ABNORMAL

## 2017-02-14 PROCEDURE — 96375 TX/PRO/DX INJ NEW DRUG ADDON: CPT

## 2017-02-14 PROCEDURE — 85025 COMPLETE CBC W/AUTO DIFF WBC: CPT

## 2017-02-14 PROCEDURE — 87086 URINE CULTURE/COLONY COUNT: CPT

## 2017-02-14 PROCEDURE — 80053 COMPREHEN METABOLIC PANEL: CPT

## 2017-02-14 PROCEDURE — 25000003 PHARM REV CODE 250: Performed by: EMERGENCY MEDICINE

## 2017-02-14 PROCEDURE — 81000 URINALYSIS NONAUTO W/SCOPE: CPT

## 2017-02-14 PROCEDURE — 96365 THER/PROPH/DIAG IV INF INIT: CPT

## 2017-02-14 PROCEDURE — 93005 ELECTROCARDIOGRAM TRACING: CPT

## 2017-02-14 PROCEDURE — 99285 EMERGENCY DEPT VISIT HI MDM: CPT | Mod: 25

## 2017-02-14 PROCEDURE — 63600175 PHARM REV CODE 636 W HCPCS: Performed by: EMERGENCY MEDICINE

## 2017-02-14 RX ORDER — MECLIZINE HYDROCHLORIDE 25 MG/1
25 TABLET ORAL
Status: COMPLETED | OUTPATIENT
Start: 2017-02-14 | End: 2017-02-14

## 2017-02-14 RX ORDER — ONDANSETRON 2 MG/ML
8 INJECTION INTRAMUSCULAR; INTRAVENOUS
Status: COMPLETED | OUTPATIENT
Start: 2017-02-14 | End: 2017-02-14

## 2017-02-14 RX ORDER — MECLIZINE HYDROCHLORIDE 25 MG/1
25 TABLET ORAL 3 TIMES DAILY PRN
Qty: 20 TABLET | Refills: 0 | Status: SHIPPED | OUTPATIENT
Start: 2017-02-14 | End: 2017-07-17

## 2017-02-14 RX ORDER — CEPHALEXIN 500 MG/1
500 CAPSULE ORAL EVERY 8 HOURS
Qty: 21 CAPSULE | Refills: 0 | Status: SHIPPED | OUTPATIENT
Start: 2017-02-14 | End: 2017-02-21

## 2017-02-14 RX ORDER — DIAZEPAM 2 MG/1
2 TABLET ORAL EVERY 8 HOURS PRN
Qty: 15 TABLET | Refills: 0 | Status: SHIPPED | OUTPATIENT
Start: 2017-02-14 | End: 2017-04-24

## 2017-02-14 RX ORDER — DIAZEPAM 10 MG/2ML
2 INJECTION INTRAMUSCULAR
Status: COMPLETED | OUTPATIENT
Start: 2017-02-14 | End: 2017-02-14

## 2017-02-14 RX ADMIN — MECLIZINE HYDROCHLORIDE 25 MG: 25 TABLET ORAL at 05:02

## 2017-02-14 RX ADMIN — PROMETHAZINE HYDROCHLORIDE 6.25 MG: 25 INJECTION INTRAMUSCULAR; INTRAVENOUS at 05:02

## 2017-02-14 RX ADMIN — ONDANSETRON 8 MG: 2 INJECTION INTRAMUSCULAR; INTRAVENOUS at 03:02

## 2017-02-14 RX ADMIN — DIAZEPAM 2 MG: 5 INJECTION, SOLUTION INTRAMUSCULAR; INTRAVENOUS at 05:02

## 2017-02-14 NOTE — ED NOTES
"Pt presents to ed with c/o nausea/vomiting, weakness, starting this morning. Reports vomiting several times pta, 2-3. Denies abdominal pain at this time, denies diarrhea, denies hematemesis. Son reports that patient has "not wanted to do anything" for approximately 1 week, states "it seems like she's giving up." Pt reports chronic generalized weakness. Pt is aaox4 at this time, sleepy but rousable. Will monitor.      "

## 2017-02-14 NOTE — ED AVS SNAPSHOT
[facility] [address]  [place] [address]               Andree Quiroz   2017  4:12 PM   ED    Description:  Female : 1950   Department:  [facility]           Your Care was Coordinated By:     Provider Role From To    Nabor Dodd MD Attending Provider 17 [phone] --      Reason for Visit     Emesis           Diagnoses this Visit        Comments    Vertigo    -  Primary     Cough         Dehydration         Urinary tract infection without hematuria, site unspecified           ED Disposition     None           To Do List           Follow-up Information     Follow up with Bertha Sherwood NP.    Specialty:  Family Medicine    Why:  Call tomorrow to schedule a follow up appointment in the next 1-2 days.      Contact information:    [address] [facility]  [address]  [place] [address]  [phone]         These Medications        Disp Refills Start End    cephALEXin (KEFLEX) 500 MG capsule 21 capsule 0 2017    Take 1 capsule (500 mg total) by mouth every 8 (eight) hours. - Oral    Pharmacy: [facility]/pharmacy #[id number] - [place], [place] - [address] [facility] Ph #: [phone]       meclizine (ANTIVERT) 25 mg tablet 20 tablet 0 2017     Take 1 tablet (25 mg total) by mouth 3 (three) times daily as needed. - Oral    Pharmacy: [facility]/pharmacy #[id number] - [place], [place] - [address] [facility] Ph #: [phone]       diazePAM (VALIUM) 2 MG tablet 15 tablet 0 2017    Take 1 tablet (2 mg total) by mouth every 8 (eight) hours as needed (dizziness). - Oral    Pharmacy: [facility]/pharmacy #[id number] - [place], [place] - [address] [facility] Ph #: [phone]         [facility] On Call     [facility] -  Assistance  Registered nurses in the [facility] provide clinical advisement, health education, appointment booking, and other advisory services.  Call for this free service at [phone].              Medications           Message regarding Medications     Verify the changes and/or additions to your medication regime listed below are the same as discussed with your clinician today.  If any of these changes or additions are incorrect, please notify your healthcare provider.        START taking these NEW medications        Refills    cephALEXin (KEFLEX) 500 MG capsule 0    Sig: Take 1 capsule (500 mg total) by mouth every 8 (eight) hours.    Class: Print    Route: Oral    meclizine (ANTIVERT) 25 mg tablet 0    Sig: Take 1 tablet (25 mg total) by mouth 3 (three) times daily as needed.    Class: Print    Route: Oral    diazePAM (VALIUM) 2 MG tablet 0    Sig: Take 1 tablet (2 mg total) by mouth every 8 (eight) hours as needed (dizziness).    Class: Print    Route: Oral      These medications were administered today        Dose Freq    ondansetron injection 8 mg 8 mg ED 1 Time    Sig: Inject 8 mg into the vein ED 1 Time.    Class: Normal    Route: Intravenous    diazePAM injection 2 mg 2 mg ED 1 Time    Sig: Inject 0.4 mLs (2 mg total) into the vein ED 1 Time.    Class: Normal    Route: Intravenous    meclizine tablet 25 mg 25 mg ED 1 Time    Sig: Take 1 tablet (25 mg total) by mouth ED 1 Time.    Class: Normal    Route: Oral    promethazine (PHENERGAN) 6.25 mg in dextrose 5 % 50 mL IVPB 6.25 mg ED 1 Time    Sig: Inject 6.25 mg into the vein ED 1 Time.    Class: Normal    Route: Intravenous           Verify that the below list of medications is an accurate representation of the medications you are currently taking.  If none reported, the list may be blank. If incorrect, please contact your healthcare provider. Carry this list with you in case of emergency.           Current Medications     amlodipine-benazepril 5-20 mg (LOTREL) 5-20 mg per capsule TAKE 1 CAPSULE BY MOUTH ONCE DAILY.    aspirin (ECOTRIN) 81 MG EC tablet Take 81 mg by mouth once daily.    atorvastatin (LIPITOR) 20 MG tablet TAKE 1/2 TABLET AT BEDTIME  "   carvedilol (COREG) 12.5 MG tablet Take 1 tablet (12.5 mg total) by mouth 2 (two) times daily with meals.    cephALEXin (KEFLEX) 500 MG capsule Take 1 capsule (500 mg total) by mouth every 8 (eight) hours.    clopidogrel (PLAVIX) 75 mg tablet Take 1 tablet (75 mg total) by mouth once daily.    diazePAM (VALIUM) 2 MG tablet Take 1 tablet (2 mg total) by mouth every 8 (eight) hours as needed (dizziness).    diazePAM injection 2 mg Inject 0.4 mLs (2 mg total) into the vein ED 1 Time.    docusate sodium (COLACE) 100 MG capsule Take 1 capsule (100 mg total) by mouth 2 (two) times daily.    ferrous sulfate 325 (65 FE) MG EC tablet Take 1 tablet (325 mg total) by mouth 2 (two) times daily with meals.    lactulose (CHRONULAC) 10 gram/15 mL solution Take by mouth 3 (three) times daily. TAKES AS NEEDED FOR CONSTIPATION    meclizine (ANTIVERT) 25 mg tablet Take 1 tablet (25 mg total) by mouth 3 (three) times daily as needed.    meclizine tablet 25 mg Take 1 tablet (25 mg total) by mouth ED 1 Time.    ondansetron (ZOFRAN) 4 MG tablet Take 1 tablet (4 mg total) by mouth every 8 (eight) hours as needed.           Clinical Reference Information           Your Vitals Were     BP Pulse Temp Resp Height Weight    147/67 68 96.2 °F (35.7 °C) (Oral) 18 5' 2" (1.575 m) 47.2 kg (104 lb)    Last Period SpO2 BMI          (LMP Unknown) 96% 19.02 kg/m2        Allergies as of 2/14/2017        Reactions    Codeine Other (See Comments)    "Made me nervous and shaky"      Immunizations Administered on Date of Encounter - 2/14/2017     None      ED Micro, Lab, POCT     Start Ordered       Status Ordering Provider    02/14/17 2002 02/14/17 2002  Urine culture  STAT      Ordered     02/14/17 1619 02/14/17 1618    STAT,   Status:  Canceled      Canceled     02/14/17 1618 02/14/17 1618  Urinalysis Microscopic  Once      Final result     02/14/17 1543 02/14/17 1618  CBC auto differential  STAT      Final result     02/14/17 1543 02/14/17 1618  " Comprehensive metabolic panel  STAT      Final result     02/14/17 1543 02/14/17 1618  Urinalysis  STAT      Final result       ED Imaging Orders     Start Ordered       Status Ordering Provider    02/14/17 1854 02/14/17 1853  X-Ray Chest PA And Lateral  1 time imaging      Final result     02/14/17 1543 02/14/17 1618  CT Head Without Contrast  1 time imaging      Final result       Discharge References/Attachments     BLADDER INFECTION, FEMALE (ADULT) (ENGLISH)    DEHYDRATION (ADULT) (ENGLISH)    DIZZINESS (VERTIGO) WITH MEDICINES, MANAGING (ENGLISH)    VERTIGO, UNSPECIFIED (ENGLISH)      Your Scheduled Appointments     Feb 20, 2017  1:30 PM CST   Infusion 120 Min with CHAIR 04 [facility]    [address], [address]  [facility] [id number] [phone]            Mar 10, 2017 10:00 AM CST   Non-Fasting Lab with APPOINTMENT LAB, [facility]    [address]  [facility] [address]   [phone]            Mar 13, 2017  1:00 PM CDT   Established Patient Visit with Saul Sandra MD   [place] [facility] ([place])    [address]  [facility] [id number] [phone]            Mar 13, 2017  1:30 PM CDT   Infusion 120 Min with CHAIR 04 [facility]    [address], [address]  [place] [id number] [phone]            Mar 31, 2017 10:00 AM CDT   Non-Fasting Lab with HOSPITAL LAB, [facility]    [address] [place] [id number]                 Smoking Cessation     If you would like to quit smoking:   You may be eligible for free services if you are a [place] resident and started smoking cigarettes before September 1, 1988.  Call the Smoking Cessation [employer] (SCT) toll free at (042) 578-2613 or (749) 028-6898.   Call 3-800-QUIT-NOW if you do not meet the above  criteria.             [facility] complies with applicable Federal civil rights laws and does not discriminate on the basis of race, color, national origin, age, disability, or sex.        Language Assistance Services     ATTENTION: Language assistance services are available, free of charge. Please call [phone].      ATENCIÓN: Si habla john, tiene a keith disposición servicios gratuitos de asistencia lingüística. Llame al [phone].     CHÚ Ý: N?u b?n nói Ti?ng Vi?t, có các d?ch v? h? tr? ngôn ng? mi?n phí dành cho b?n. G?i s? [phone].

## 2017-02-16 LAB — BACTERIA UR CULT: NO GROWTH

## 2017-03-13 ENCOUNTER — OFFICE VISIT (OUTPATIENT)
Dept: HEMATOLOGY/ONCOLOGY | Facility: CLINIC | Age: 67
End: 2017-03-13
Payer: MEDICARE

## 2017-03-13 VITALS
OXYGEN SATURATION: 96 % | HEIGHT: 62 IN | DIASTOLIC BLOOD PRESSURE: 70 MMHG | TEMPERATURE: 98 F | SYSTOLIC BLOOD PRESSURE: 124 MMHG | HEART RATE: 88 BPM | WEIGHT: 95.88 LBS | BODY MASS INDEX: 17.64 KG/M2

## 2017-03-13 DIAGNOSIS — E87.6 HYPOKALEMIA: ICD-10-CM

## 2017-03-13 DIAGNOSIS — R05.9 COUGH: Primary | ICD-10-CM

## 2017-03-13 DIAGNOSIS — C34.92 STAGE IV SQUAMOUS CELL CARCINOMA OF LEFT LUNG: ICD-10-CM

## 2017-03-13 DIAGNOSIS — J06.9 VIRAL UPPER RESPIRATORY ILLNESS: ICD-10-CM

## 2017-03-13 PROCEDURE — 99214 OFFICE O/P EST MOD 30 MIN: CPT | Mod: S$GLB,,, | Performed by: INTERNAL MEDICINE

## 2017-03-13 PROCEDURE — 1125F AMNT PAIN NOTED PAIN PRSNT: CPT | Mod: S$GLB,,, | Performed by: INTERNAL MEDICINE

## 2017-03-13 PROCEDURE — 1160F RVW MEDS BY RX/DR IN RCRD: CPT | Mod: S$GLB,,, | Performed by: INTERNAL MEDICINE

## 2017-03-13 PROCEDURE — 1159F MED LIST DOCD IN RCRD: CPT | Mod: S$GLB,,, | Performed by: INTERNAL MEDICINE

## 2017-03-13 PROCEDURE — 1157F ADVNC CARE PLAN IN RCRD: CPT | Mod: S$GLB,,, | Performed by: INTERNAL MEDICINE

## 2017-03-13 PROCEDURE — 99499 UNLISTED E&M SERVICE: CPT | Mod: S$PBB,,, | Performed by: INTERNAL MEDICINE

## 2017-03-13 PROCEDURE — 99999 PR PBB SHADOW E&M-EST. PATIENT-LVL III: CPT | Mod: PBBFAC,,, | Performed by: INTERNAL MEDICINE

## 2017-03-13 PROCEDURE — 3078F DIAST BP <80 MM HG: CPT | Mod: S$GLB,,, | Performed by: INTERNAL MEDICINE

## 2017-03-13 PROCEDURE — 3074F SYST BP LT 130 MM HG: CPT | Mod: S$GLB,,, | Performed by: INTERNAL MEDICINE

## 2017-03-13 RX ORDER — HYDROCODONE BITARTRATE AND HOMATROPINE METHYLBROMIDE ORAL SOLUTION 5; 1.5 MG/5ML; MG/5ML
5 LIQUID ORAL EVERY 4 HOURS PRN
Qty: 473 ML | Refills: 0 | Status: SHIPPED | OUTPATIENT
Start: 2017-03-13 | End: 2017-04-03

## 2017-03-13 RX ORDER — POTASSIUM CHLORIDE 750 MG/1
10 CAPSULE, EXTENDED RELEASE ORAL DAILY
Qty: 30 CAPSULE | Refills: 1 | Status: SHIPPED | OUTPATIENT
Start: 2017-03-13 | End: 2017-05-15

## 2017-03-13 NOTE — PROGRESS NOTES
Subjective:       Patient ID: Andree Quiroz is a 66 y.o. female.    Chief Complaint: Lung Cancer (lab results)    Lung Cancer   Associated symptoms include coughing and fatigue. Pertinent negatives include no abdominal pain, chest pain, fever, headaches, nausea, rash or weakness.      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  She had some nausea and dizziness at [employer], went to the ER, chest x-ray was ordered that showed a left suprahilar lesion.  CT of the chest with contrast 9/20/15 Showed 6.5 by 4.4 centimeter left upper lobe mass abutting the mediastinum.  CT-guided biopsy showed squamous cell carcinoma. PET scan did not show any extrathoracic disease. EBUS revealed - Station 4R (contralateral mediastinal lymph node) was negative. Station 7 (subcarinal) was positive for squamous cell carcinoma. Was felt not to be a surgical candidate.    Nov-Dec 2015 - Received concurrent ChemoRT with weekly carbo/taxol.  PET scan July 2016 showed progressive disease.    August 2016 - 2nd line chemotherapy with carboplatin and gemcitabine was started.    Here for follow-up  Denies any chest pain, difficulty breathing or hemoptysis.  No new pains.  No cough.  Continues to stay active.    CT chest 1/11/17 - Continued progression of disease with interval increase in size of bilateral pulmonary nodules.  PDL 1 testing shows 10% (low expression) TPS - tumor proportion score - scheduled to start KEYTRUDA.    She was in the emergency room twice within the last few weeks with vertigo and an upper respiratory viral illness and is currently taking Tamiflu and Keflex.  She feels weak and fatigue.  She is still recovering from the after effects of flow.  She has myalgias and arthralgias.  She lost a lot of weight and is slowly getting her appetite back.  Still has a cough.  No expectoration.      Review of Systems   Constitutional: Positive for activity change, appetite change, fatigue  and unexpected weight change. Negative for fever.   HENT: Negative for facial swelling and nosebleeds.    Eyes: Negative for photophobia and pain.   Respiratory: Positive for cough. Negative for shortness of breath.    Cardiovascular: Negative for chest pain and leg swelling.   Gastrointestinal: Negative for abdominal pain, blood in stool, constipation and nausea.   Genitourinary: Negative for dysuria and hematuria.   Skin: Negative for color change and rash.   Neurological: Negative for seizures, weakness and headaches.   Hematological: Negative for adenopathy. Does not bruise/bleed easily.   All other systems reviewed and are negative.        Objective:      Physical Exam   Constitutional: She is oriented to person, place, and time. She appears well-developed and well-nourished. No distress.   HENT:   Mouth/Throat: Oropharynx is clear and moist and mucous membranes are normal. Mucous membranes are not pale. No oropharyngeal exudate.   Eyes: Conjunctivae are normal. No scleral icterus.   Neck: Normal range of motion. Neck supple. No thyroid mass (Thyroid area is non-tender) and no thyromegaly present.   Cardiovascular: Normal rate and regular rhythm.  Exam reveals no friction rub.    Pulmonary/Chest: Effort normal and breath sounds normal. No accessory muscle usage or stridor. No respiratory distress. She has no wheezes.   Abdominal: She exhibits no ascites and no mass. There is no hepatosplenomegaly. There is no tenderness.   Musculoskeletal: She exhibits no edema.   No varicosities noted.   Lymphadenopathy:     She has no cervical adenopathy.        Right: No supraclavicular adenopathy present.        Left: No supraclavicular adenopathy present.   Neurological: She is alert and oriented to person, place, and time.   Psychiatric: She has a normal mood and affect. Judgment and thought content normal. Cognition and memory are normal. She exhibits normal recent memory and normal remote memory.       Assessment:      1. Cough    2. 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 Viral upper respiratory illness    4. Hypokalemia       Plan:   She still recovering from the after effects of influenza, still has myalgias and arthralgias.  Discussed all these aspects with her.  Will hold off on starting KEYTRUDA at this point.    Prescribed potassium chloride for hypokalemia.    Will check a CMP and a CT chest without contrast and see her back for follow-up in 2 weeks.  Will plan to start KEYTRUDA at that time.

## 2017-04-03 ENCOUNTER — OFFICE VISIT (OUTPATIENT)
Dept: HEMATOLOGY/ONCOLOGY | Facility: CLINIC | Age: 67
End: 2017-04-03
Payer: MEDICARE

## 2017-04-03 ENCOUNTER — INFUSION (OUTPATIENT)
Dept: INFUSION THERAPY | Facility: HOSPITAL | Age: 67
End: 2017-04-03
Attending: INTERNAL MEDICINE
Payer: MEDICARE

## 2017-04-03 VITALS
BODY MASS INDEX: 17.45 KG/M2 | HEIGHT: 62 IN | SYSTOLIC BLOOD PRESSURE: 110 MMHG | OXYGEN SATURATION: 93 % | HEART RATE: 93 BPM | WEIGHT: 94.81 LBS | TEMPERATURE: 98 F | DIASTOLIC BLOOD PRESSURE: 60 MMHG

## 2017-04-03 DIAGNOSIS — C78.02 MALIGNANT NEOPLASM METASTATIC TO LEFT LUNG: ICD-10-CM

## 2017-04-03 DIAGNOSIS — C34.92 SQUAMOUS CELL CARCINOMA OF LUNGS, BILATERAL: ICD-10-CM

## 2017-04-03 DIAGNOSIS — Z51.11 ENCOUNTER FOR ANTINEOPLASTIC CHEMOTHERAPY: ICD-10-CM

## 2017-04-03 DIAGNOSIS — C34.91 SQUAMOUS CELL CARCINOMA OF LUNGS, BILATERAL: Primary | ICD-10-CM

## 2017-04-03 DIAGNOSIS — C34.91 SQUAMOUS CELL CARCINOMA OF LUNGS, BILATERAL: ICD-10-CM

## 2017-04-03 DIAGNOSIS — C34.92 SQUAMOUS CELL CARCINOMA OF LUNGS, BILATERAL: Primary | ICD-10-CM

## 2017-04-03 DIAGNOSIS — K59.00 CONSTIPATION, UNSPECIFIED CONSTIPATION TYPE: ICD-10-CM

## 2017-04-03 DIAGNOSIS — C34.92 STAGE IV SQUAMOUS CELL CARCINOMA OF LEFT LUNG: Primary | ICD-10-CM

## 2017-04-03 DIAGNOSIS — D50.8 IRON DEFICIENCY ANEMIA SECONDARY TO INADEQUATE DIETARY IRON INTAKE: ICD-10-CM

## 2017-04-03 PROCEDURE — 96413 CHEMO IV INFUSION 1 HR: CPT

## 2017-04-03 PROCEDURE — 63600175 PHARM REV CODE 636 W HCPCS: Performed by: INTERNAL MEDICINE

## 2017-04-03 PROCEDURE — 1126F AMNT PAIN NOTED NONE PRSNT: CPT | Mod: S$GLB,,, | Performed by: INTERNAL MEDICINE

## 2017-04-03 PROCEDURE — 1157F ADVNC CARE PLAN IN RCRD: CPT | Mod: S$GLB,,, | Performed by: INTERNAL MEDICINE

## 2017-04-03 PROCEDURE — 1159F MED LIST DOCD IN RCRD: CPT | Mod: S$GLB,,, | Performed by: INTERNAL MEDICINE

## 2017-04-03 PROCEDURE — 1160F RVW MEDS BY RX/DR IN RCRD: CPT | Mod: S$GLB,,, | Performed by: INTERNAL MEDICINE

## 2017-04-03 PROCEDURE — 99999 PR PBB SHADOW E&M-EST. PATIENT-LVL III: CPT | Mod: PBBFAC,,, | Performed by: INTERNAL MEDICINE

## 2017-04-03 PROCEDURE — 99499 UNLISTED E&M SERVICE: CPT | Mod: S$PBB,,, | Performed by: INTERNAL MEDICINE

## 2017-04-03 PROCEDURE — 3074F SYST BP LT 130 MM HG: CPT | Mod: S$GLB,,, | Performed by: INTERNAL MEDICINE

## 2017-04-03 PROCEDURE — 3078F DIAST BP <80 MM HG: CPT | Mod: S$GLB,,, | Performed by: INTERNAL MEDICINE

## 2017-04-03 PROCEDURE — 96367 TX/PROPH/DG ADDL SEQ IV INF: CPT

## 2017-04-03 PROCEDURE — 99214 OFFICE O/P EST MOD 30 MIN: CPT | Mod: S$GLB,,, | Performed by: INTERNAL MEDICINE

## 2017-04-03 PROCEDURE — 25000003 PHARM REV CODE 250: Performed by: INTERNAL MEDICINE

## 2017-04-03 RX ORDER — AMLODIPINE AND BENAZEPRIL HYDROCHLORIDE 5; 20 MG/1; MG/1
CAPSULE ORAL
Qty: 30 CAPSULE | Refills: 2 | Status: SHIPPED | OUTPATIENT
Start: 2017-04-03 | End: 2017-05-16

## 2017-04-03 RX ORDER — SODIUM CHLORIDE 0.9 % (FLUSH) 0.9 %
10 SYRINGE (ML) INJECTION
Status: DISCONTINUED | OUTPATIENT
Start: 2017-04-03 | End: 2017-04-03 | Stop reason: HOSPADM

## 2017-04-03 RX ORDER — HEPARIN 100 UNIT/ML
500 SYRINGE INTRAVENOUS
Status: DISCONTINUED | OUTPATIENT
Start: 2017-04-03 | End: 2017-04-03 | Stop reason: HOSPADM

## 2017-04-03 RX ORDER — LACTULOSE 10 G/15ML
20 SOLUTION ORAL; RECTAL 3 TIMES DAILY
Qty: 473 ML | Refills: 1 | Status: SHIPPED | OUTPATIENT
Start: 2017-04-03 | End: 2017-07-14

## 2017-04-03 RX ORDER — FERROUS SULFATE 324(65)MG
325 TABLET, DELAYED RELEASE (ENTERIC COATED) ORAL 2 TIMES DAILY
Refills: 0 | COMMUNITY
Start: 2017-04-03 | End: 2017-07-14 | Stop reason: ALTCHOICE

## 2017-04-03 RX ADMIN — SODIUM CHLORIDE: 0.9 INJECTION, SOLUTION INTRAVENOUS at 11:04

## 2017-04-03 RX ADMIN — HEPARIN SODIUM (PORCINE) LOCK FLUSH IV SOLN 100 UNIT/ML 500 UNITS: 100 SOLUTION at 12:04

## 2017-04-03 RX ADMIN — SODIUM CHLORIDE 200 MG: 0.9 INJECTION, SOLUTION INTRAVENOUS at 11:04

## 2017-04-03 RX ADMIN — DIPHENHYDRAMINE HYDROCHLORIDE 12.5 MG: 50 INJECTION, SOLUTION INTRAMUSCULAR; INTRAVENOUS at 11:04

## 2017-04-03 NOTE — NURSING
Pt tolerated Keytruda chemo well. No adverse reaction noted. Pt education reinforced on chemo regimen, side effects, what to expect, and when to call __. Pt verbalized understanding. I reviewed pt calendar w/ pt and understanding verbalized. PAC deaccessed and flushed w/ NS and heparin per protocol.

## 2017-04-03 NOTE — PROGRESS NOTES
Subjective:       Patient ID: Andree Quiroz is a 66 y.o. female.    Chief Complaint: Lung Cancer (lab, CT results)    Lung Cancer   Associated symptoms include fatigue. Pertinent negatives include no abdominal pain, chest pain, coughing, fever, headaches, nausea, rash or weakness.      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  She had some nausea and dizziness at [employer], went to the ER, chest x-ray was ordered that showed a left suprahilar lesion.  CT of the chest with contrast 9/20/15 Showed 6.5 by 4.4 centimeter left upper lobe mass abutting the mediastinum.  CT-guided biopsy showed squamous cell carcinoma. PET scan did not show any extrathoracic disease. EBUS revealed - Station 4R (contralateral mediastinal lymph node) was negative. Station 7 (subcarinal) was positive for squamous cell carcinoma. Was felt not to be a surgical candidate.    Nov-Dec 2015 - Received concurrent ChemoRT with weekly carbo/taxol.  PET scan July 2016 showed progressive disease.    August 2016 - 2nd line chemotherapy with carboplatin and gemcitabine was started.    Here for follow-up  Denies any chest pain, difficulty breathing or hemoptysis.  No new pains.  No cough.  Continues to stay active.    CT chest 1/11/17 - Continued progression of disease with interval increase in size of bilateral pulmonary nodules.  PDL 1 testing shows 10% (low expression) TPS - tumor proportion score - scheduled to start KEYTRUDA.    CT chest 3/31 - Metastatic disease to the lung appears to have slightly progressed compared to the prior exam.    Review of Systems   Constitutional: Positive for fatigue. Negative for activity change, fever and unexpected weight change.   HENT: Negative for facial swelling and nosebleeds.    Eyes: Negative for photophobia and pain.   Respiratory: Negative for cough and shortness of breath.    Cardiovascular: Negative for chest pain and leg swelling.   Gastrointestinal:  Negative for abdominal pain, blood in stool, constipation and nausea.   Genitourinary: Negative for dysuria and hematuria.   Skin: Negative for color change and rash.   Neurological: Negative for seizures, weakness and headaches.   Hematological: Negative for adenopathy. Does not bruise/bleed easily.   All other systems reviewed and are negative.        Objective:      Physical Exam   Constitutional: She is oriented to person, place, and time. She appears well-developed and well-nourished. No distress.   HENT:   Mouth/Throat: Oropharynx is clear and moist and mucous membranes are normal. Mucous membranes are not pale. No oropharyngeal exudate.   Eyes: Conjunctivae are normal. No scleral icterus.   Neck: Normal range of motion. Neck supple. No thyroid mass (Thyroid area is non-tender) and no thyromegaly present.   Cardiovascular: Normal rate and regular rhythm.  Exam reveals no friction rub.    Pulmonary/Chest: Effort normal and breath sounds normal. No accessory muscle usage or stridor. No respiratory distress. She has no wheezes.   Abdominal: She exhibits no ascites and no mass. There is no hepatosplenomegaly. There is no tenderness.   Musculoskeletal: She exhibits no edema.   No varicosities noted.   Lymphadenopathy:     She has no cervical adenopathy.        Right: No supraclavicular adenopathy present.        Left: No supraclavicular adenopathy present.   Neurological: She is alert and oriented to person, place, and time.   Psychiatric: She has a normal mood and affect. Judgment and thought content normal. Cognition and memory are normal. She exhibits normal recent memory and normal remote memory.       Assessment:     1. 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 Encounter for antineoplastic chemotherapy       Plan:   Flu like symptoms better.  Reviewed results of the CT scan.  There is progression of disease.  Will start KEYTRUDA.    Iron deficiency  noted. Start ferrous sulphate bid.    Continue potassium chloride for hypokalemia.    Repeat labs and follow-up in 3 weeks.

## 2017-04-06 ENCOUNTER — TELEPHONE (OUTPATIENT)
Dept: FAMILY MEDICINE | Facility: CLINIC | Age: 67
End: 2017-04-06

## 2017-04-06 NOTE — TELEPHONE ENCOUNTER
----- Message from Carina Orellana sent at 4/6/2017  2:10 PM CDT -----  No. [phone]   Patient needs a script for Amlodipine called into [facility] in [place].

## 2017-04-19 DIAGNOSIS — Z51.12 ENCOUNTER FOR ANTINEOPLASTIC IMMUNOTHERAPY: ICD-10-CM

## 2017-04-19 DIAGNOSIS — R53.83 FATIGUE: ICD-10-CM

## 2017-04-19 DIAGNOSIS — C34.92 STAGE IV SQUAMOUS CELL CARCINOMA OF LEFT LUNG: Primary | ICD-10-CM

## 2017-04-24 ENCOUNTER — INFUSION (OUTPATIENT)
Dept: INFUSION THERAPY | Facility: HOSPITAL | Age: 67
End: 2017-04-24
Attending: INTERNAL MEDICINE
Payer: MEDICARE

## 2017-04-24 ENCOUNTER — OFFICE VISIT (OUTPATIENT)
Dept: HEMATOLOGY/ONCOLOGY | Facility: CLINIC | Age: 67
End: 2017-04-24
Payer: MEDICARE

## 2017-04-24 VITALS
SYSTOLIC BLOOD PRESSURE: 130 MMHG | TEMPERATURE: 98 F | WEIGHT: 93.25 LBS | DIASTOLIC BLOOD PRESSURE: 70 MMHG | HEIGHT: 62 IN | HEART RATE: 101 BPM | OXYGEN SATURATION: 96 % | BODY MASS INDEX: 17.16 KG/M2

## 2017-04-24 DIAGNOSIS — Z51.11 ENCOUNTER FOR ANTINEOPLASTIC CHEMOTHERAPY: ICD-10-CM

## 2017-04-24 DIAGNOSIS — C34.92 SQUAMOUS CELL CARCINOMA OF LUNGS, BILATERAL: Primary | ICD-10-CM

## 2017-04-24 DIAGNOSIS — C34.91 SQUAMOUS CELL CARCINOMA OF LUNGS, BILATERAL: Primary | ICD-10-CM

## 2017-04-24 DIAGNOSIS — D50.8 IRON DEFICIENCY ANEMIA SECONDARY TO INADEQUATE DIETARY IRON INTAKE: ICD-10-CM

## 2017-04-24 DIAGNOSIS — C34.92 STAGE IV SQUAMOUS CELL CARCINOMA OF LEFT LUNG: ICD-10-CM

## 2017-04-24 DIAGNOSIS — C78.02 MALIGNANT NEOPLASM METASTATIC TO LEFT LUNG: ICD-10-CM

## 2017-04-24 PROCEDURE — 96367 TX/PROPH/DG ADDL SEQ IV INF: CPT

## 2017-04-24 PROCEDURE — 25000003 PHARM REV CODE 250: Performed by: INTERNAL MEDICINE

## 2017-04-24 PROCEDURE — 1160F RVW MEDS BY RX/DR IN RCRD: CPT | Mod: S$GLB,,, | Performed by: INTERNAL MEDICINE

## 2017-04-24 PROCEDURE — 99999 PR PBB SHADOW E&M-EST. PATIENT-LVL III: CPT | Mod: PBBFAC,,, | Performed by: INTERNAL MEDICINE

## 2017-04-24 PROCEDURE — 1126F AMNT PAIN NOTED NONE PRSNT: CPT | Mod: S$GLB,,, | Performed by: INTERNAL MEDICINE

## 2017-04-24 PROCEDURE — 96413 CHEMO IV INFUSION 1 HR: CPT

## 2017-04-24 PROCEDURE — 1159F MED LIST DOCD IN RCRD: CPT | Mod: S$GLB,,, | Performed by: INTERNAL MEDICINE

## 2017-04-24 PROCEDURE — 99214 OFFICE O/P EST MOD 30 MIN: CPT | Mod: S$GLB,,, | Performed by: INTERNAL MEDICINE

## 2017-04-24 PROCEDURE — 3075F SYST BP GE 130 - 139MM HG: CPT | Mod: S$GLB,,, | Performed by: INTERNAL MEDICINE

## 2017-04-24 PROCEDURE — 99499 UNLISTED E&M SERVICE: CPT | Mod: S$PBB,,, | Performed by: INTERNAL MEDICINE

## 2017-04-24 PROCEDURE — 63600175 PHARM REV CODE 636 W HCPCS: Performed by: INTERNAL MEDICINE

## 2017-04-24 PROCEDURE — 3078F DIAST BP <80 MM HG: CPT | Mod: S$GLB,,, | Performed by: INTERNAL MEDICINE

## 2017-04-24 RX ORDER — SODIUM CHLORIDE 0.9 % (FLUSH) 0.9 %
10 SYRINGE (ML) INJECTION
Status: CANCELLED | OUTPATIENT
Start: 2017-04-24

## 2017-04-24 RX ORDER — HEPARIN 100 UNIT/ML
500 SYRINGE INTRAVENOUS
Status: DISCONTINUED | OUTPATIENT
Start: 2017-04-24 | End: 2017-04-24 | Stop reason: HOSPADM

## 2017-04-24 RX ORDER — SODIUM CHLORIDE 0.9 % (FLUSH) 0.9 %
10 SYRINGE (ML) INJECTION
Status: DISCONTINUED | OUTPATIENT
Start: 2017-04-24 | End: 2017-04-24 | Stop reason: HOSPADM

## 2017-04-24 RX ORDER — HEPARIN 100 UNIT/ML
500 SYRINGE INTRAVENOUS
Status: CANCELLED | OUTPATIENT
Start: 2017-04-24

## 2017-04-24 RX ADMIN — HEPARIN SODIUM (PORCINE) LOCK FLUSH IV SOLN 100 UNIT/ML 500 UNITS: 100 SOLUTION at 12:04

## 2017-04-24 RX ADMIN — DIPHENHYDRAMINE HYDROCHLORIDE 12.5 MG: 50 INJECTION INTRAMUSCULAR; INTRAVENOUS at 11:04

## 2017-04-24 RX ADMIN — SODIUM CHLORIDE: 0.9 INJECTION, SOLUTION INTRAVENOUS at 11:04

## 2017-04-24 RX ADMIN — SODIUM CHLORIDE 200 MG: 0.9 INJECTION, SOLUTION INTRAVENOUS at 11:04

## 2017-04-24 NOTE — NURSING
Pt tolerated chemo well. No adverse reaction noted. Pt education reinforced on chemo regimen, side effects, what to expect, and when to call _Jocy_. Pt verbalized understanding. I reviewed pt calendar w/ pt and understanding verbalized. PAC deaccessed and flushed w/ NS and heparin per protocol.

## 2017-04-24 NOTE — PROGRESS NOTES
Subjective:       Patient ID: Andree Quiroz is a 66 y.o. female.    Chief Complaint: Lung Cancer (lab results)    Lung Cancer   Associated symptoms include fatigue. Pertinent negatives include no abdominal pain, chest pain, coughing, fever, headaches, nausea, rash or weakness.      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  She had some nausea and dizziness at [employer], went to the ER, chest x-ray was ordered that showed a left suprahilar lesion.  CT of the chest with contrast 9/20/15 Showed 6.5 by 4.4 centimeter left upper lobe mass abutting the mediastinum.  CT-guided biopsy showed squamous cell carcinoma. PET scan did not show any extrathoracic disease. EBUS revealed - Station 4R (contralateral mediastinal lymph node) was negative. Station 7 (subcarinal) was positive for squamous cell carcinoma. Was felt not to be a surgical candidate.    Nov-Dec 2015 - Received concurrent ChemoRT with weekly carbo/taxol.  PET scan July 2016 showed progressive disease.    August 2016 - 2nd line chemotherapy with carboplatin and gemcitabine was started.    Here for follow-up  Denies any chest pain, difficulty breathing or hemoptysis.  No new pains.  No cough.  Continues to stay active.    CT chest 1/11/17 - Continued progression of disease with interval increase in size of bilateral pulmonary nodules.  PDL 1 testing shows 10% (low expression) TPS - tumor proportion score - scheduled to start KEYTRUDA.    CT chest 3/31 - Metastatic disease to the lung appears to have slightly progressed compared to the prior exam.    Started KEYTRUDA 4/3.  Taking iron pills.  Stools getting dark.  Taking lactulose for that.  Has diarrhea with lactulose.  Has some tiredness and fatigue after starting KEYTRUDA.    Foot hasn't been tasting too good with the new chemotherapy.  Lost 1 pound.    Review of Systems   Constitutional: Positive for fatigue. Negative for activity change, fever and unexpected  weight change.   HENT: Negative for facial swelling and nosebleeds.    Eyes: Negative for photophobia and pain.   Respiratory: Negative for cough and shortness of breath.    Cardiovascular: Negative for chest pain and leg swelling.   Gastrointestinal: Negative for abdominal pain, blood in stool, constipation and nausea.   Genitourinary: Negative for dysuria and hematuria.   Skin: Negative for color change and rash.   Neurological: Negative for seizures, weakness and headaches.   Hematological: Negative for adenopathy. Does not bruise/bleed easily.   All other systems reviewed and are negative.        Objective:      Physical Exam   Constitutional: She is oriented to person, place, and time. She appears well-developed and well-nourished. No distress.   HENT:   Mouth/Throat: Oropharynx is clear and moist and mucous membranes are normal. Mucous membranes are not pale. No oropharyngeal exudate.   Eyes: Conjunctivae are normal. No scleral icterus.   Neck: Normal range of motion. Neck supple. No thyroid mass (Thyroid area is non-tender) and no thyromegaly present.   Cardiovascular: Normal rate and regular rhythm.  Exam reveals no friction rub.    Pulmonary/Chest: Effort normal and breath sounds normal. No accessory muscle usage or stridor. No respiratory distress. She has no wheezes.   Abdominal: She exhibits no ascites and no mass. There is no hepatosplenomegaly. There is no tenderness.   Musculoskeletal: She exhibits no edema.   No varicosities noted.   Lymphadenopathy:     She has no cervical adenopathy.        Right: No supraclavicular adenopathy present.        Left: No supraclavicular adenopathy present.   Neurological: She is alert and oriented to person, place, and time.   Psychiatric: She has a normal mood and affect. Judgment and thought content normal. Cognition and memory are normal. She exhibits normal recent memory and normal remote memory.       Assessment:     1. Squamous cell carcinoma of lungs, bilateral     2. 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 Encounter for antineoplastic chemotherapy    4. Iron deficiency anemia secondary to inadequate dietary iron intake       Plan:   Labs reviewed.  Potassium level normal.  Okay to discontinue progression chloride.    Proceed with cycle 2 of KEYTRUDA today.  Plan restaging scans after 4 doses of KEYTRUDA.    Asked her to decrease the dose of lactulose to see if she will no longer have the diarrhea.    She will continue oral iron twice a day.  Plan to repeat iron levels in a couple of months.    Repeat labs in follow-up in 3 weeks.

## 2017-05-03 DIAGNOSIS — Z51.11 ENCOUNTER FOR ANTINEOPLASTIC CHEMOTHERAPY: ICD-10-CM

## 2017-05-03 DIAGNOSIS — C34.92 STAGE IV SQUAMOUS CELL CARCINOMA OF LEFT LUNG: Primary | ICD-10-CM

## 2017-05-15 ENCOUNTER — INFUSION (OUTPATIENT)
Dept: INFUSION THERAPY | Facility: HOSPITAL | Age: 67
End: 2017-05-15
Attending: INTERNAL MEDICINE
Payer: MEDICARE

## 2017-05-15 ENCOUNTER — OFFICE VISIT (OUTPATIENT)
Dept: HEMATOLOGY/ONCOLOGY | Facility: CLINIC | Age: 67
End: 2017-05-15
Payer: MEDICARE

## 2017-05-15 VITALS
DIASTOLIC BLOOD PRESSURE: 60 MMHG | WEIGHT: 93.25 LBS | SYSTOLIC BLOOD PRESSURE: 110 MMHG | OXYGEN SATURATION: 98 % | TEMPERATURE: 98 F | BODY MASS INDEX: 17.16 KG/M2 | HEIGHT: 62 IN | HEART RATE: 83 BPM

## 2017-05-15 VITALS — RESPIRATION RATE: 18 BRPM

## 2017-05-15 DIAGNOSIS — C78.02 MALIGNANT NEOPLASM METASTATIC TO LEFT LUNG: ICD-10-CM

## 2017-05-15 DIAGNOSIS — C34.92 STAGE IV SQUAMOUS CELL CARCINOMA OF LEFT LUNG: Primary | ICD-10-CM

## 2017-05-15 DIAGNOSIS — C34.92 SQUAMOUS CELL CARCINOMA OF LUNGS, BILATERAL: Primary | ICD-10-CM

## 2017-05-15 DIAGNOSIS — Z51.11 ENCOUNTER FOR ANTINEOPLASTIC CHEMOTHERAPY: ICD-10-CM

## 2017-05-15 DIAGNOSIS — C34.91 SQUAMOUS CELL CARCINOMA OF LUNGS, BILATERAL: Primary | ICD-10-CM

## 2017-05-15 DIAGNOSIS — G47.01 INSOMNIA DUE TO MEDICAL CONDITION: ICD-10-CM

## 2017-05-15 PROBLEM — J06.9 VIRAL UPPER RESPIRATORY ILLNESS: Status: RESOLVED | Noted: 2017-02-04 | Resolved: 2017-05-15

## 2017-05-15 PROCEDURE — 1159F MED LIST DOCD IN RCRD: CPT | Mod: S$GLB,,, | Performed by: INTERNAL MEDICINE

## 2017-05-15 PROCEDURE — 96367 TX/PROPH/DG ADDL SEQ IV INF: CPT

## 2017-05-15 PROCEDURE — 3074F SYST BP LT 130 MM HG: CPT | Mod: S$GLB,,, | Performed by: INTERNAL MEDICINE

## 2017-05-15 PROCEDURE — 25000003 PHARM REV CODE 250: Performed by: INTERNAL MEDICINE

## 2017-05-15 PROCEDURE — 99999 PR PBB SHADOW E&M-EST. PATIENT-LVL III: CPT | Mod: PBBFAC,,, | Performed by: INTERNAL MEDICINE

## 2017-05-15 PROCEDURE — 1125F AMNT PAIN NOTED PAIN PRSNT: CPT | Mod: S$GLB,,, | Performed by: INTERNAL MEDICINE

## 2017-05-15 PROCEDURE — 96413 CHEMO IV INFUSION 1 HR: CPT

## 2017-05-15 PROCEDURE — 1160F RVW MEDS BY RX/DR IN RCRD: CPT | Mod: S$GLB,,, | Performed by: INTERNAL MEDICINE

## 2017-05-15 PROCEDURE — 63600175 PHARM REV CODE 636 W HCPCS: Performed by: INTERNAL MEDICINE

## 2017-05-15 PROCEDURE — 99499 UNLISTED E&M SERVICE: CPT | Mod: S$PBB,,, | Performed by: INTERNAL MEDICINE

## 2017-05-15 PROCEDURE — 99214 OFFICE O/P EST MOD 30 MIN: CPT | Mod: S$GLB,,, | Performed by: INTERNAL MEDICINE

## 2017-05-15 PROCEDURE — 3078F DIAST BP <80 MM HG: CPT | Mod: S$GLB,,, | Performed by: INTERNAL MEDICINE

## 2017-05-15 RX ORDER — MIRTAZAPINE 15 MG/1
15 TABLET, FILM COATED ORAL NIGHTLY
Qty: 30 TABLET | Refills: 0 | Status: SHIPPED | OUTPATIENT
Start: 2017-05-15 | End: 2017-07-17

## 2017-05-15 RX ORDER — SODIUM CHLORIDE 0.9 % (FLUSH) 0.9 %
10 SYRINGE (ML) INJECTION
Status: CANCELLED | OUTPATIENT
Start: 2017-05-15

## 2017-05-15 RX ORDER — SODIUM CHLORIDE 0.9 % (FLUSH) 0.9 %
10 SYRINGE (ML) INJECTION
Status: DISCONTINUED | OUTPATIENT
Start: 2017-05-15 | End: 2017-05-15 | Stop reason: HOSPADM

## 2017-05-15 RX ORDER — HEPARIN 100 UNIT/ML
500 SYRINGE INTRAVENOUS
Status: CANCELLED | OUTPATIENT
Start: 2017-05-15

## 2017-05-15 RX ORDER — HEPARIN 100 UNIT/ML
500 SYRINGE INTRAVENOUS
Status: DISCONTINUED | OUTPATIENT
Start: 2017-05-15 | End: 2017-05-15 | Stop reason: HOSPADM

## 2017-05-15 RX ADMIN — DIPHENHYDRAMINE HYDROCHLORIDE 12.5 MG: 50 INJECTION INTRAMUSCULAR; INTRAVENOUS at 11:05

## 2017-05-15 RX ADMIN — SODIUM CHLORIDE, PRESERVATIVE FREE 10 ML: 5 INJECTION INTRAVENOUS at 12:05

## 2017-05-15 RX ADMIN — SODIUM CHLORIDE: 0.9 INJECTION, SOLUTION INTRAVENOUS at 11:05

## 2017-05-15 RX ADMIN — ADO-TRASTUZUMAB EMTANSINE 200 MG: 20 INJECTION, POWDER, LYOPHILIZED, FOR SOLUTION INTRAVENOUS at 12:05

## 2017-05-15 RX ADMIN — HEPARIN SODIUM (PORCINE) LOCK FLUSH IV SOLN 100 UNIT/ML 500 UNITS: 100 SOLUTION at 12:05

## 2017-05-15 NOTE — PROGRESS NOTES
Subjective:       Patient ID: Andree Quiroz is a 66 y.o. female.    Chief Complaint: Lung Cancer (lab results)    Lung Cancer   Associated symptoms include fatigue. Pertinent negatives include no abdominal pain, chest pain, coughing, fever, headaches, nausea, rash or weakness.      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  She had some nausea and dizziness at [employer], went to the ER, chest x-ray was ordered that showed a left suprahilar lesion.  CT of the chest with contrast 9/20/15 Showed 6.5 by 4.4 centimeter left upper lobe mass abutting the mediastinum.  CT-guided biopsy showed squamous cell carcinoma. PET scan did not show any extrathoracic disease. EBUS revealed - Station 4R (contralateral mediastinal lymph node) was negative. Station 7 (subcarinal) was positive for squamous cell carcinoma. Was felt not to be a surgical candidate.    Nov-Dec 2015 - Received concurrent ChemoRT with weekly carbo/taxol.  PET scan July 2016 showed progressive disease.    August 2016 - 2nd line chemotherapy with carboplatin and gemcitabine was started.    Here for follow-up  Denies any chest pain, difficulty breathing or hemoptysis.  No new pains.  No cough.  Continues to stay active.    CT chest 1/11/17 - Continued progression of disease with interval increase in size of bilateral pulmonary nodules.  PDL 1 testing shows 10% (low expression) TPS - tumor proportion score - scheduled to start KEYTRUDA.    CT chest 3/31 - Metastatic disease to the lung appears to have slightly progressed compared to the prior exam.    Started KEYTRUDA 4/3.  Taking iron pills.  Stools getting dark.  Taking lactulose for that.  Has diarrhea with lactulose.  Has some tiredness and fatigue after starting KEYTRUDA.    Foot hasn't been tasting too good with the new chemotherapy.    She is under a lot of stress with financial issues.  She brought some forms for student loan forgiveness.    Review of  Systems   Constitutional: Positive for fatigue. Negative for activity change, fever and unexpected weight change.   HENT: Negative for facial swelling and nosebleeds.    Eyes: Negative for photophobia and pain.   Respiratory: Negative for cough and shortness of breath.    Cardiovascular: Negative for chest pain and leg swelling.   Gastrointestinal: Negative for abdominal pain, blood in stool, constipation and nausea.   Genitourinary: Negative for dysuria and hematuria.   Skin: Negative for color change and rash.   Neurological: Negative for seizures, weakness and headaches.   Hematological: Negative for adenopathy. Does not bruise/bleed easily.         Objective:      Physical Exam   Constitutional: She is oriented to person, place, and time. She appears well-developed and well-nourished. No distress.   HENT:   Mouth/Throat: Oropharynx is clear and moist and mucous membranes are normal. Mucous membranes are not pale. No oropharyngeal exudate.   Eyes: Conjunctivae are normal. No scleral icterus.   Neck: Normal range of motion. Neck supple. No thyroid mass (Thyroid area is non-tender) and no thyromegaly present.   Cardiovascular: Normal rate and regular rhythm.  Exam reveals no friction rub.    Pulmonary/Chest: Effort normal and breath sounds normal. No accessory muscle usage or stridor. No respiratory distress. She has no wheezes.   Abdominal: She exhibits no ascites and no mass. There is no hepatosplenomegaly. There is no tenderness.   Musculoskeletal: She exhibits no edema.   No varicosities noted.   Lymphadenopathy:     She has no cervical adenopathy.        Right: No supraclavicular adenopathy present.        Left: No supraclavicular adenopathy present.   Neurological: She is alert and oriented to person, place, and time.   Psychiatric: She has a normal mood and affect. Judgment and thought content normal. Cognition and memory are normal. She exhibits normal recent memory and normal remote memory.        Assessment:     1. 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 Encounter for antineoplastic chemotherapy    3. Insomnia due to medical condition       Plan:   Labs reviewed.  Potassium level normal.  No need to restart progression chloride.    Proceed with cycle 3 of KEYTRUDA today.  Plan restaging scans after 4 doses of KEYTRUDA.    She will continue oral iron twice a day.  Plan to repeat iron levels in a couple of months.    Trial of Remeron for insomnia.      Will consult Dr. Brooks Greer for counseling.    RTC 3 weeks.

## 2017-05-15 NOTE — NURSING
Pt tolerated Keytruda well. No adverse reaction noted. Pt education reinforced on chemo regimen, side effects, what to expect, and when to call _Jocy_. Pt verbalized understanding. I reviewed pt calendar w/ pt and understanding verbalized. PAC deaccessed and flushed w/ NS and heparin per protocol.

## 2017-05-18 ENCOUNTER — TELEPHONE (OUTPATIENT)
Dept: INFUSION THERAPY | Facility: HOSPITAL | Age: 67
End: 2017-05-18

## 2017-05-23 ENCOUNTER — TELEPHONE (OUTPATIENT)
Dept: INFUSION THERAPY | Facility: HOSPITAL | Age: 67
End: 2017-05-23

## 2017-06-01 ENCOUNTER — OFFICE VISIT (OUTPATIENT)
Dept: HEMATOLOGY/ONCOLOGY | Facility: CLINIC | Age: 67
End: 2017-06-01
Payer: MEDICARE

## 2017-06-01 ENCOUNTER — LAB VISIT (OUTPATIENT)
Dept: LAB | Facility: HOSPITAL | Age: 67
End: 2017-06-01
Attending: INTERNAL MEDICINE
Payer: MEDICARE

## 2017-06-01 VITALS
WEIGHT: 94.13 LBS | TEMPERATURE: 98 F | OXYGEN SATURATION: 96 % | DIASTOLIC BLOOD PRESSURE: 84 MMHG | HEIGHT: 62 IN | HEART RATE: 101 BPM | BODY MASS INDEX: 17.32 KG/M2 | SYSTOLIC BLOOD PRESSURE: 152 MMHG

## 2017-06-01 DIAGNOSIS — G47.01 INSOMNIA DUE TO MEDICAL CONDITION: ICD-10-CM

## 2017-06-01 DIAGNOSIS — C34.92 STAGE IV SQUAMOUS CELL CARCINOMA OF LEFT LUNG: Primary | ICD-10-CM

## 2017-06-01 DIAGNOSIS — Z51.11 ENCOUNTER FOR ANTINEOPLASTIC CHEMOTHERAPY: ICD-10-CM

## 2017-06-01 DIAGNOSIS — C34.92 STAGE IV SQUAMOUS CELL CARCINOMA OF LEFT LUNG: ICD-10-CM

## 2017-06-01 DIAGNOSIS — R53.1 WEAKNESS: ICD-10-CM

## 2017-06-01 DIAGNOSIS — D50.8 IRON DEFICIENCY ANEMIA SECONDARY TO INADEQUATE DIETARY IRON INTAKE: ICD-10-CM

## 2017-06-01 PROBLEM — C34.91: Status: RESOLVED | Noted: 2017-01-12 | Resolved: 2017-06-01

## 2017-06-01 LAB
ALBUMIN SERPL BCP-MCNC: 3.4 G/DL
ALP SERPL-CCNC: 104 U/L
ALT SERPL W/O P-5'-P-CCNC: 17 U/L
ANION GAP SERPL CALC-SCNC: 7 MMOL/L
AST SERPL-CCNC: 23 U/L
BASOPHILS # BLD AUTO: 0.03 K/UL
BASOPHILS NFR BLD: 0.6 %
BILIRUB SERPL-MCNC: 0.3 MG/DL
BUN SERPL-MCNC: 7 MG/DL
CALCIUM SERPL-MCNC: 9.7 MG/DL
CHLORIDE SERPL-SCNC: 105 MMOL/L
CO2 SERPL-SCNC: 28 MMOL/L
CREAT SERPL-MCNC: 0.8 MG/DL
DIFFERENTIAL METHOD: ABNORMAL
EOSINOPHIL # BLD AUTO: 0.2 K/UL
EOSINOPHIL NFR BLD: 3.1 %
ERYTHROCYTE [DISTWIDTH] IN BLOOD BY AUTOMATED COUNT: 18.1 %
EST. GFR  (AFRICAN AMERICAN): >60 ML/MIN/1.73 M^2
EST. GFR  (NON AFRICAN AMERICAN): >60 ML/MIN/1.73 M^2
GLUCOSE SERPL-MCNC: 106 MG/DL
HCT VFR BLD AUTO: 29.3 %
HGB BLD-MCNC: 9.1 G/DL
LYMPHOCYTES # BLD AUTO: 2 K/UL
LYMPHOCYTES NFR BLD: 39.1 %
MCH RBC QN AUTO: 24.4 PG
MCHC RBC AUTO-ENTMCNC: 31.1 %
MCV RBC AUTO: 79 FL
MONOCYTES # BLD AUTO: 0.4 K/UL
MONOCYTES NFR BLD: 8.3 %
NEUTROPHILS # BLD AUTO: 2.5 K/UL
NEUTROPHILS NFR BLD: 48.9 %
PLATELET # BLD AUTO: 416 K/UL
PMV BLD AUTO: 10.9 FL
POTASSIUM SERPL-SCNC: 3.6 MMOL/L
PROT SERPL-MCNC: 7.9 G/DL
RBC # BLD AUTO: 3.73 M/UL
SODIUM SERPL-SCNC: 140 MMOL/L
TSH SERPL DL<=0.005 MIU/L-ACNC: 1.25 UIU/ML
WBC # BLD AUTO: 5.17 K/UL

## 2017-06-01 PROCEDURE — 80053 COMPREHEN METABOLIC PANEL: CPT

## 2017-06-01 PROCEDURE — 1126F AMNT PAIN NOTED NONE PRSNT: CPT | Mod: S$GLB,,, | Performed by: INTERNAL MEDICINE

## 2017-06-01 PROCEDURE — 99214 OFFICE O/P EST MOD 30 MIN: CPT | Mod: S$GLB,,, | Performed by: INTERNAL MEDICINE

## 2017-06-01 PROCEDURE — 85025 COMPLETE CBC W/AUTO DIFF WBC: CPT

## 2017-06-01 PROCEDURE — 99999 PR PBB SHADOW E&M-EST. PATIENT-LVL III: CPT | Mod: PBBFAC,,, | Performed by: INTERNAL MEDICINE

## 2017-06-01 PROCEDURE — 36415 COLL VENOUS BLD VENIPUNCTURE: CPT

## 2017-06-01 PROCEDURE — 99499 UNLISTED E&M SERVICE: CPT | Mod: S$PBB,,, | Performed by: INTERNAL MEDICINE

## 2017-06-01 PROCEDURE — 84443 ASSAY THYROID STIM HORMONE: CPT

## 2017-06-01 PROCEDURE — 1159F MED LIST DOCD IN RCRD: CPT | Mod: S$GLB,,, | Performed by: INTERNAL MEDICINE

## 2017-06-01 RX ORDER — HEPARIN 100 UNIT/ML
500 SYRINGE INTRAVENOUS
Status: CANCELLED | OUTPATIENT
Start: 2017-06-01

## 2017-06-01 RX ORDER — HEPARIN 100 UNIT/ML
500 SYRINGE INTRAVENOUS
Status: CANCELLED | OUTPATIENT
Start: 2017-06-05

## 2017-06-01 RX ORDER — SODIUM CHLORIDE 0.9 % (FLUSH) 0.9 %
10 SYRINGE (ML) INJECTION
Status: CANCELLED | OUTPATIENT
Start: 2017-06-01

## 2017-06-01 RX ORDER — SODIUM CHLORIDE 0.9 % (FLUSH) 0.9 %
10 SYRINGE (ML) INJECTION
Status: CANCELLED | OUTPATIENT
Start: 2017-06-05

## 2017-06-01 NOTE — PROGRESS NOTES
Subjective:       Patient ID: Andree Quiroz is a 66 y.o. female.    Chief Complaint: Lung Cancer (stat lab results)    Lung Cancer   Associated symptoms include fatigue. Pertinent negatives include no abdominal pain, chest pain, coughing, fever, headaches, nausea, rash or weakness.      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  She had some nausea and dizziness at [employer], went to the ER, chest x-ray was ordered that showed a left suprahilar lesion.  CT of the chest with contrast 9/20/15 Showed 6.5 by 4.4 centimeter left upper lobe mass abutting the mediastinum.  CT-guided biopsy showed squamous cell carcinoma. PET scan did not show any extrathoracic disease. EBUS revealed - Station 4R (contralateral mediastinal lymph node) was negative. Station 7 (subcarinal) was positive for squamous cell carcinoma. Was felt not to be a surgical candidate.    Nov-Dec 2015 - Received concurrent ChemoRT with weekly carbo/taxol.  PET scan July 2016 showed progressive disease.    August 2016 - 2nd line chemotherapy with carboplatin and gemcitabine was started.    Here for follow-up  Denies any chest pain, difficulty breathing or hemoptysis.  No new pains.  No cough.  Continues to stay active.    CT chest 1/11/17 - Continued progression of disease with interval increase in size of bilateral pulmonary nodules.  PDL 1 testing shows 10% (low expression) TPS - tumor proportion score - scheduled to start KEYTRUDA.    CT chest 3/31 - Metastatic disease to the lung appears to have slightly progressed compared to the prior exam.    Started KEYTRUDA 4/3.    She is taking oral iron and his been on it for 2 months but is causing constipation and so she is taking only one pill every other day because she's not able tolerate it.    She saw  who decreased her hypertensive medications because of symptomatic hypotension.    Review of Systems   Constitutional: Positive for fatigue.  Negative for activity change, fever and unexpected weight change.   HENT: Negative for facial swelling and nosebleeds.    Eyes: Negative for photophobia and pain.   Respiratory: Negative for cough and shortness of breath.    Cardiovascular: Negative for chest pain and leg swelling.   Gastrointestinal: Negative for abdominal pain, blood in stool, constipation and nausea.   Genitourinary: Negative for dysuria and hematuria.   Skin: Negative for color change and rash.   Neurological: Negative for seizures, weakness and headaches.   Hematological: Negative for adenopathy. Does not bruise/bleed easily.         Objective:      Physical Exam   Constitutional: She is oriented to person, place, and time. She appears well-developed and well-nourished. No distress.   HENT:   Mouth/Throat: Oropharynx is clear and moist and mucous membranes are normal. Mucous membranes are not pale. No oropharyngeal exudate.   Eyes: Conjunctivae are normal. No scleral icterus.   Neck: Normal range of motion. Neck supple. No thyroid mass (Thyroid area is non-tender) and no thyromegaly present.   Cardiovascular: Normal rate and regular rhythm.  Exam reveals no friction rub.    Pulmonary/Chest: Effort normal and breath sounds normal. No accessory muscle usage or stridor. No respiratory distress. She has no wheezes.   Abdominal: She exhibits no ascites and no mass. There is no hepatosplenomegaly. There is no tenderness.   Musculoskeletal: She exhibits no edema.   No varicosities noted.   Lymphadenopathy:     She has no cervical adenopathy.        Right: No supraclavicular adenopathy present.        Left: No supraclavicular adenopathy present.   Neurological: She is alert and oriented to person, place, and time.   Psychiatric: She has a normal mood and affect. Judgment and thought content normal. Cognition and memory are normal. She exhibits normal recent memory and normal remote memory.       Assessment:     1. Stage IV squamous cell carcinoma of  left lung    2. Encounter for antineoplastic chemotherapy    3. Iron deficiency anemia secondary to inadequate dietary iron intake    4. Insomnia due to medical condition       Plan:   Labs reviewed.  Potassium level normal.  No need to restart progression chloride.    Proceed with cycle 4 of KEYTRUDA as scheduled. Plan restaging scans after 4 doses of KEYTRUDA.    She is intolerant to oral iron.  She has severe iron deficiency.  Hence will give her 1 dose of Injectafer.  Discussed about this aspect with her and she is agreeable. 3 weeks.    Continue trial of Remeron for insomnia.      Plan restaging scans prior to cycle #5.  She inquires about KEYTRUDA whether she should continue and also inquires about a chemotherapy break from KEYTRUDA.  Will address all these aspects after restaging scans in a few weeks.

## 2017-06-05 ENCOUNTER — INFUSION (OUTPATIENT)
Dept: INFUSION THERAPY | Facility: HOSPITAL | Age: 67
End: 2017-06-05
Attending: INTERNAL MEDICINE
Payer: MEDICARE

## 2017-06-05 VITALS
HEART RATE: 84 BPM | SYSTOLIC BLOOD PRESSURE: 136 MMHG | RESPIRATION RATE: 18 BRPM | TEMPERATURE: 98 F | DIASTOLIC BLOOD PRESSURE: 63 MMHG

## 2017-06-05 DIAGNOSIS — D50.8 IRON DEFICIENCY ANEMIA SECONDARY TO INADEQUATE DIETARY IRON INTAKE: Primary | ICD-10-CM

## 2017-06-05 DIAGNOSIS — C78.02 MALIGNANT NEOPLASM METASTATIC TO LEFT LUNG: ICD-10-CM

## 2017-06-05 PROCEDURE — 25000003 PHARM REV CODE 250: Performed by: INTERNAL MEDICINE

## 2017-06-05 PROCEDURE — 96367 TX/PROPH/DG ADDL SEQ IV INF: CPT

## 2017-06-05 PROCEDURE — 96413 CHEMO IV INFUSION 1 HR: CPT

## 2017-06-05 PROCEDURE — 63600175 PHARM REV CODE 636 W HCPCS: Performed by: INTERNAL MEDICINE

## 2017-06-05 RX ORDER — HEPARIN 100 UNIT/ML
500 SYRINGE INTRAVENOUS
Status: DISCONTINUED | OUTPATIENT
Start: 2017-06-05 | End: 2017-06-05 | Stop reason: HOSPADM

## 2017-06-05 RX ORDER — SODIUM CHLORIDE 0.9 % (FLUSH) 0.9 %
10 SYRINGE (ML) INJECTION
Status: DISCONTINUED | OUTPATIENT
Start: 2017-06-05 | End: 2017-06-05 | Stop reason: HOSPADM

## 2017-06-05 RX ADMIN — SODIUM CHLORIDE: 0.9 INJECTION, SOLUTION INTRAVENOUS at 10:06

## 2017-06-05 RX ADMIN — HEPARIN SODIUM (PORCINE) LOCK FLUSH IV SOLN 100 UNIT/ML 500 UNITS: 100 SOLUTION at 11:06

## 2017-06-05 RX ADMIN — DIPHENHYDRAMINE HYDROCHLORIDE 12.5 MG: 50 INJECTION INTRAMUSCULAR; INTRAVENOUS at 10:06

## 2017-06-05 RX ADMIN — FERRIC CARBOXYMALTOSE INJECTION 750 MG: 50 INJECTION, SOLUTION INTRAVENOUS at 10:06

## 2017-06-05 RX ADMIN — SODIUM CHLORIDE 200 MG: 0.9 INJECTION, SOLUTION INTRAVENOUS at 11:06

## 2017-06-05 NOTE — NURSING
Pt tolerated Keytruda and Injectafer infusions well. No adverse reaction noted. Pt education reinforced on chemo regimen, side effects, what to expect, and when to call _Jocy_. Pt verbalized understanding. I reviewed pt calendar w/ pt and understanding verbalized. PAC deaccessed and flushed w/ NS and heparin per protocol.

## 2017-06-26 ENCOUNTER — OFFICE VISIT (OUTPATIENT)
Dept: HEMATOLOGY/ONCOLOGY | Facility: CLINIC | Age: 67
End: 2017-06-26
Payer: MEDICARE

## 2017-06-26 ENCOUNTER — INFUSION (OUTPATIENT)
Dept: INFUSION THERAPY | Facility: HOSPITAL | Age: 67
End: 2017-06-26
Attending: INTERNAL MEDICINE
Payer: MEDICARE

## 2017-06-26 VITALS
HEART RATE: 77 BPM | HEIGHT: 63 IN | SYSTOLIC BLOOD PRESSURE: 108 MMHG | WEIGHT: 98 LBS | TEMPERATURE: 98 F | DIASTOLIC BLOOD PRESSURE: 61 MMHG | BODY MASS INDEX: 17.36 KG/M2

## 2017-06-26 DIAGNOSIS — C78.02 MALIGNANT NEOPLASM METASTATIC TO LEFT LUNG: Primary | ICD-10-CM

## 2017-06-26 DIAGNOSIS — C34.92 STAGE IV SQUAMOUS CELL CARCINOMA OF LEFT LUNG: Primary | ICD-10-CM

## 2017-06-26 DIAGNOSIS — Z51.11 ENCOUNTER FOR ANTINEOPLASTIC CHEMOTHERAPY: ICD-10-CM

## 2017-06-26 DIAGNOSIS — G47.01 INSOMNIA DUE TO MEDICAL CONDITION: ICD-10-CM

## 2017-06-26 PROCEDURE — 1126F AMNT PAIN NOTED NONE PRSNT: CPT | Mod: S$GLB,,, | Performed by: INTERNAL MEDICINE

## 2017-06-26 PROCEDURE — 63600175 PHARM REV CODE 636 W HCPCS: Performed by: INTERNAL MEDICINE

## 2017-06-26 PROCEDURE — 99499 UNLISTED E&M SERVICE: CPT | Mod: S$PBB,,, | Performed by: INTERNAL MEDICINE

## 2017-06-26 PROCEDURE — 96413 CHEMO IV INFUSION 1 HR: CPT

## 2017-06-26 PROCEDURE — 96367 TX/PROPH/DG ADDL SEQ IV INF: CPT

## 2017-06-26 PROCEDURE — 99999 PR PBB SHADOW E&M-EST. PATIENT-LVL III: CPT | Mod: PBBFAC,,, | Performed by: INTERNAL MEDICINE

## 2017-06-26 PROCEDURE — 1159F MED LIST DOCD IN RCRD: CPT | Mod: S$GLB,,, | Performed by: INTERNAL MEDICINE

## 2017-06-26 PROCEDURE — 25000003 PHARM REV CODE 250: Performed by: INTERNAL MEDICINE

## 2017-06-26 PROCEDURE — 99214 OFFICE O/P EST MOD 30 MIN: CPT | Mod: S$GLB,,, | Performed by: INTERNAL MEDICINE

## 2017-06-26 RX ORDER — HEPARIN 100 UNIT/ML
500 SYRINGE INTRAVENOUS
Status: CANCELLED | OUTPATIENT
Start: 2017-06-26

## 2017-06-26 RX ORDER — HEPARIN 100 UNIT/ML
500 SYRINGE INTRAVENOUS
Status: DISCONTINUED | OUTPATIENT
Start: 2017-06-26 | End: 2017-06-26 | Stop reason: HOSPADM

## 2017-06-26 RX ORDER — SODIUM CHLORIDE 0.9 % (FLUSH) 0.9 %
10 SYRINGE (ML) INJECTION
Status: CANCELLED | OUTPATIENT
Start: 2017-06-26

## 2017-06-26 RX ORDER — SODIUM CHLORIDE 0.9 % (FLUSH) 0.9 %
10 SYRINGE (ML) INJECTION
Status: DISCONTINUED | OUTPATIENT
Start: 2017-06-26 | End: 2017-06-26 | Stop reason: HOSPADM

## 2017-06-26 RX ADMIN — DIPHENHYDRAMINE HYDROCHLORIDE 12.5 MG: 50 INJECTION INTRAMUSCULAR; INTRAVENOUS at 11:06

## 2017-06-26 RX ADMIN — SODIUM CHLORIDE 200 MG: 0.9 INJECTION, SOLUTION INTRAVENOUS at 11:06

## 2017-06-26 RX ADMIN — HEPARIN SODIUM (PORCINE) LOCK FLUSH IV SOLN 100 UNIT/ML 500 UNITS: 100 SOLUTION at 12:06

## 2017-06-26 RX ADMIN — SODIUM CHLORIDE: 0.9 INJECTION, SOLUTION INTRAVENOUS at 11:06

## 2017-06-26 NOTE — PROGRESS NOTES
Subjective:       Patient ID: Andree Quiroz is a 66 y.o. female.    Chief Complaint: Results (Blood and CT scan)    Lung Cancer   Associated symptoms include fatigue. Pertinent negatives include no abdominal pain, chest pain, coughing, fever, headaches, nausea, rash or weakness.      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  She had some nausea and dizziness at [employer], went to the ER, chest x-ray was ordered that showed a left suprahilar lesion.  CT of the chest with contrast 9/20/15 Showed 6.5 by 4.4 centimeter left upper lobe mass abutting the mediastinum.  CT-guided biopsy showed squamous cell carcinoma. PET scan did not show any extrathoracic disease. EBUS revealed - Station 4R (contralateral mediastinal lymph node) was negative. Station 7 (subcarinal) was positive for squamous cell carcinoma. Was felt not to be a surgical candidate.    Nov-Dec 2015 - Received concurrent ChemoRT with weekly carbo/taxol.  PET scan July 2016 showed progressive disease.    August 2016 - 2nd line chemotherapy with carboplatin and gemcitabine was started.    Here for follow-up  Denies any chest pain, difficulty breathing or hemoptysis.  No new pains.  No cough.  Continues to stay active.    CT chest 1/11/17 - Continued progression of disease with interval increase in size of bilateral pulmonary nodules.  PDL 1 testing shows 10% (low expression) TPS - tumor proportion score - scheduled to start KEYTRUDA.    CT chest 3/31 - Metastatic disease to the lung appears to have slightly progressed compared to the prior exam.    Started KEYTRUDA 4/3.    She is taking oral iron and his been on it for 2 months but is causing constipation and so she is taking only one pill every other day because she's not able tolerate it.    She saw  who decreased her hypertensive medications because of symptomatic hypotension.    Review of Systems   Constitutional: Positive for fatigue. Negative  for activity change, fever and unexpected weight change.   HENT: Negative for facial swelling and nosebleeds.    Eyes: Negative for photophobia and pain.   Respiratory: Negative for cough and shortness of breath.    Cardiovascular: Negative for chest pain and leg swelling.   Gastrointestinal: Negative for abdominal pain, blood in stool, constipation and nausea.   Genitourinary: Negative for dysuria and hematuria.   Skin: Negative for color change and rash.   Neurological: Negative for seizures, weakness and headaches.   Hematological: Negative for adenopathy. Does not bruise/bleed easily.         Objective:      Physical Exam   Constitutional: She is oriented to person, place, and time. She appears well-developed and well-nourished. No distress.   HENT:   Mouth/Throat: Oropharynx is clear and moist and mucous membranes are normal. Mucous membranes are not pale. No oropharyngeal exudate.   Eyes: Conjunctivae are normal. No scleral icterus.   Neck: Normal range of motion. Neck supple. No thyroid mass (Thyroid area is non-tender) and no thyromegaly present.   Cardiovascular: Normal rate and regular rhythm.  Exam reveals no friction rub.    Pulmonary/Chest: Effort normal and breath sounds normal. No accessory muscle usage or stridor. No respiratory distress. She has no wheezes.   Abdominal: She exhibits no ascites and no mass. There is no hepatosplenomegaly. There is no tenderness.   Musculoskeletal: She exhibits no edema.   No varicosities noted.   Lymphadenopathy:     She has no cervical adenopathy.        Right: No supraclavicular adenopathy present.        Left: No supraclavicular adenopathy present.   Neurological: She is alert and oriented to person, place, and time.   Psychiatric: She has a normal mood and affect. Judgment and thought content normal. Cognition and memory are normal. She exhibits normal recent memory and normal remote memory.       Assessment:     1. Stage IV squamous cell carcinoma of left lung     2. Encounter for antineoplastic chemotherapy    3. Insomnia due to medical condition       Plan:   Recent restaging scans show positive response to ongoing KEYTRUDA.    Proceed with cycle 5 of KEYTRUDA as scheduled. Plan restaging scans after total of 10 doses of KEYTRUDA.    Continue Remeron for insomnia.

## 2017-07-07 ENCOUNTER — TELEPHONE (OUTPATIENT)
Dept: HEMATOLOGY/ONCOLOGY | Facility: CLINIC | Age: 67
End: 2017-07-07

## 2017-07-07 NOTE — TELEPHONE ENCOUNTER
Informed pt that we received her fax and that she needs to sign the second page. She will sign and re-fax.    ----- Message from Heydi Block sent at 7/7/2017  8:53 AM CDT -----  Contact: Self/[phone]  Patient said she will be faxing paperwork to be filled out by you. She needs the papers faxed to [facility] fax # [phone]. Please advise

## 2017-07-14 ENCOUNTER — LAB VISIT (OUTPATIENT)
Dept: LAB | Facility: HOSPITAL | Age: 67
End: 2017-07-14
Attending: INTERNAL MEDICINE
Payer: MEDICARE

## 2017-07-14 DIAGNOSIS — C34.92 STAGE IV SQUAMOUS CELL CARCINOMA OF LEFT LUNG: ICD-10-CM

## 2017-07-14 DIAGNOSIS — Z51.12 ENCOUNTER FOR ANTINEOPLASTIC IMMUNOTHERAPY: ICD-10-CM

## 2017-07-14 LAB
ALBUMIN SERPL BCP-MCNC: 3.3 G/DL
ALP SERPL-CCNC: 116 U/L
ALT SERPL W/O P-5'-P-CCNC: 7 U/L
ANION GAP SERPL CALC-SCNC: 8 MMOL/L
AST SERPL-CCNC: 14 U/L
BASOPHILS # BLD AUTO: 0.02 K/UL
BASOPHILS NFR BLD: 0.4 %
BILIRUB SERPL-MCNC: 0.3 MG/DL
BUN SERPL-MCNC: 10 MG/DL
CALCIUM SERPL-MCNC: 9.6 MG/DL
CHLORIDE SERPL-SCNC: 104 MMOL/L
CO2 SERPL-SCNC: 28 MMOL/L
CREAT SERPL-MCNC: 0.8 MG/DL
DIFFERENTIAL METHOD: ABNORMAL
EOSINOPHIL # BLD AUTO: 0.1 K/UL
EOSINOPHIL NFR BLD: 2.8 %
ERYTHROCYTE [DISTWIDTH] IN BLOOD BY AUTOMATED COUNT: 19.4 %
EST. GFR  (AFRICAN AMERICAN): >60 ML/MIN/1.73 M^2
EST. GFR  (NON AFRICAN AMERICAN): >60 ML/MIN/1.73 M^2
GLUCOSE SERPL-MCNC: 104 MG/DL
HCT VFR BLD AUTO: 36.3 %
HGB BLD-MCNC: 11.3 G/DL
LYMPHOCYTES # BLD AUTO: 1.6 K/UL
LYMPHOCYTES NFR BLD: 31.9 %
MCH RBC QN AUTO: 26 PG
MCHC RBC AUTO-ENTMCNC: 31.1 %
MCV RBC AUTO: 84 FL
MONOCYTES # BLD AUTO: 0.5 K/UL
MONOCYTES NFR BLD: 9.1 %
NEUTROPHILS # BLD AUTO: 2.8 K/UL
NEUTROPHILS NFR BLD: 55.8 %
PLATELET # BLD AUTO: 260 K/UL
PMV BLD AUTO: 11.1 FL
POTASSIUM SERPL-SCNC: 3.6 MMOL/L
PROT SERPL-MCNC: 7.6 G/DL
RBC # BLD AUTO: 4.34 M/UL
SODIUM SERPL-SCNC: 140 MMOL/L
WBC # BLD AUTO: 4.96 K/UL

## 2017-07-14 PROCEDURE — 80053 COMPREHEN METABOLIC PANEL: CPT

## 2017-07-14 PROCEDURE — 85025 COMPLETE CBC W/AUTO DIFF WBC: CPT

## 2017-07-14 PROCEDURE — 36415 COLL VENOUS BLD VENIPUNCTURE: CPT

## 2017-07-17 ENCOUNTER — OFFICE VISIT (OUTPATIENT)
Dept: HEMATOLOGY/ONCOLOGY | Facility: CLINIC | Age: 67
End: 2017-07-17
Payer: MEDICARE

## 2017-07-17 ENCOUNTER — INFUSION (OUTPATIENT)
Dept: INFUSION THERAPY | Facility: HOSPITAL | Age: 67
End: 2017-07-17
Attending: INTERNAL MEDICINE
Payer: MEDICARE

## 2017-07-17 VITALS
BODY MASS INDEX: 16.95 KG/M2 | OXYGEN SATURATION: 98 % | HEART RATE: 82 BPM | DIASTOLIC BLOOD PRESSURE: 76 MMHG | WEIGHT: 92.13 LBS | SYSTOLIC BLOOD PRESSURE: 136 MMHG | HEIGHT: 62 IN | TEMPERATURE: 98 F

## 2017-07-17 DIAGNOSIS — C34.91 SQUAMOUS CELL CARCINOMA OF LUNGS, BILATERAL: ICD-10-CM

## 2017-07-17 DIAGNOSIS — C34.92 SQUAMOUS CELL CARCINOMA OF LUNGS, BILATERAL: ICD-10-CM

## 2017-07-17 DIAGNOSIS — Z51.12 ENCOUNTER FOR ANTINEOPLASTIC IMMUNOTHERAPY: ICD-10-CM

## 2017-07-17 DIAGNOSIS — Z51.11 ENCOUNTER FOR ANTINEOPLASTIC CHEMOTHERAPY: ICD-10-CM

## 2017-07-17 DIAGNOSIS — C34.92 STAGE IV SQUAMOUS CELL CARCINOMA OF LEFT LUNG: Primary | ICD-10-CM

## 2017-07-17 DIAGNOSIS — C78.02 MALIGNANT NEOPLASM METASTATIC TO LEFT LUNG: Primary | ICD-10-CM

## 2017-07-17 PROCEDURE — 99214 OFFICE O/P EST MOD 30 MIN: CPT | Mod: S$GLB,,, | Performed by: INTERNAL MEDICINE

## 2017-07-17 PROCEDURE — 1126F AMNT PAIN NOTED NONE PRSNT: CPT | Mod: S$GLB,,, | Performed by: INTERNAL MEDICINE

## 2017-07-17 PROCEDURE — 96413 CHEMO IV INFUSION 1 HR: CPT

## 2017-07-17 PROCEDURE — 99999 PR PBB SHADOW E&M-EST. PATIENT-LVL III: CPT | Mod: PBBFAC,,, | Performed by: INTERNAL MEDICINE

## 2017-07-17 PROCEDURE — 96367 TX/PROPH/DG ADDL SEQ IV INF: CPT

## 2017-07-17 PROCEDURE — 99499 UNLISTED E&M SERVICE: CPT | Mod: S$PBB,,, | Performed by: INTERNAL MEDICINE

## 2017-07-17 PROCEDURE — 25000003 PHARM REV CODE 250: Performed by: INTERNAL MEDICINE

## 2017-07-17 PROCEDURE — 1159F MED LIST DOCD IN RCRD: CPT | Mod: S$GLB,,, | Performed by: INTERNAL MEDICINE

## 2017-07-17 PROCEDURE — 63600175 PHARM REV CODE 636 W HCPCS: Performed by: INTERNAL MEDICINE

## 2017-07-17 RX ORDER — HEPARIN 100 UNIT/ML
500 SYRINGE INTRAVENOUS
Status: CANCELLED | OUTPATIENT
Start: 2017-09-18

## 2017-07-17 RX ORDER — SODIUM CHLORIDE 0.9 % (FLUSH) 0.9 %
10 SYRINGE (ML) INJECTION
Status: CANCELLED | OUTPATIENT
Start: 2017-07-17

## 2017-07-17 RX ORDER — HEPARIN 100 UNIT/ML
500 SYRINGE INTRAVENOUS
Status: CANCELLED | OUTPATIENT
Start: 2017-07-17

## 2017-07-17 RX ORDER — SODIUM CHLORIDE 0.9 % (FLUSH) 0.9 %
10 SYRINGE (ML) INJECTION
Status: CANCELLED | OUTPATIENT
Start: 2017-09-18

## 2017-07-17 RX ORDER — SODIUM CHLORIDE 0.9 % (FLUSH) 0.9 %
10 SYRINGE (ML) INJECTION
Status: DISCONTINUED | OUTPATIENT
Start: 2017-07-17 | End: 2017-07-17 | Stop reason: HOSPADM

## 2017-07-17 RX ORDER — HEPARIN 100 UNIT/ML
500 SYRINGE INTRAVENOUS
Status: DISCONTINUED | OUTPATIENT
Start: 2017-07-17 | End: 2017-07-17 | Stop reason: HOSPADM

## 2017-07-17 RX ORDER — HEPARIN 100 UNIT/ML
500 SYRINGE INTRAVENOUS
Status: CANCELLED | OUTPATIENT
Start: 2017-08-28

## 2017-07-17 RX ORDER — SODIUM CHLORIDE 0.9 % (FLUSH) 0.9 %
10 SYRINGE (ML) INJECTION
Status: CANCELLED | OUTPATIENT
Start: 2017-08-07

## 2017-07-17 RX ORDER — HEPARIN 100 UNIT/ML
500 SYRINGE INTRAVENOUS
Status: CANCELLED | OUTPATIENT
Start: 2017-08-07

## 2017-07-17 RX ORDER — SODIUM CHLORIDE 0.9 % (FLUSH) 0.9 %
10 SYRINGE (ML) INJECTION
Status: CANCELLED | OUTPATIENT
Start: 2017-08-28

## 2017-07-17 RX ORDER — MIRTAZAPINE 15 MG/1
15 TABLET, FILM COATED ORAL NIGHTLY
COMMUNITY
End: 2017-10-09

## 2017-07-17 RX ADMIN — HEPARIN SODIUM (PORCINE) LOCK FLUSH IV SOLN 100 UNIT/ML 500 UNITS: 100 SOLUTION at 01:07

## 2017-07-17 RX ADMIN — DIPHENHYDRAMINE HYDROCHLORIDE 12.5 MG: 50 INJECTION INTRAMUSCULAR; INTRAVENOUS at 12:07

## 2017-07-17 RX ADMIN — SODIUM CHLORIDE: 0.9 INJECTION, SOLUTION INTRAVENOUS at 12:07

## 2017-07-17 RX ADMIN — SODIUM CHLORIDE 200 MG: 0.9 INJECTION, SOLUTION INTRAVENOUS at 12:07

## 2017-07-17 NOTE — NURSING
Pt tolerated chemo well. No adverse reaction noted. Pt education reinforced on chemo regimen, side effects, what to expect, and when to call . Pt verbalized understanding. I reviewed pt calendar w/ pt and understanding verbalized. PAC deaccessed and flushed w/ NS and heparin per protocol.

## 2017-07-17 NOTE — PROGRESS NOTES
Subjective:       Patient ID: Andree Quiroz is a 66 y.o. female.    Chief Complaint: Lung Cancer    Lung Cancer   Associated symptoms include fatigue. Pertinent negatives include no abdominal pain, chest pain, coughing, fever, headaches, nausea, rash or weakness.      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  She had some nausea and dizziness at [employer], went to the ER, chest x-ray was ordered that showed a left suprahilar lesion.  CT of the chest with contrast 9/20/15 Showed 6.5 by 4.4 centimeter left upper lobe mass abutting the mediastinum.  CT-guided biopsy showed squamous cell carcinoma. PET scan did not show any extrathoracic disease. EBUS revealed - Station 4R (contralateral mediastinal lymph node) was negative. Station 7 (subcarinal) was positive for squamous cell carcinoma. Was felt not to be a surgical candidate.    Nov-Dec 2015 - Received concurrent ChemoRT with weekly carbo/taxol.  PET scan July 2016 showed progressive disease.    August 2016 - 2nd line chemotherapy with carboplatin and gemcitabine was started.    Here for follow-up  Denies any chest pain, difficulty breathing or hemoptysis.  No new pains.  No cough.  Continues to stay active.    CT chest 1/11/17 - Continued progression of disease with interval increase in size of bilateral pulmonary nodules.  PDL 1 testing shows 10% (low expression) TPS - tumor proportion score - scheduled to start KEYTRUDA.    CT chest 3/31 - Metastatic disease to the lung appears to have slightly progressed compared to the prior exam.    Started KEYTRUDA 4/3.    Got 1 dose Injectafer 6/5/17.    F/u with .    Review of Systems   Constitutional: Positive for fatigue. Negative for activity change, fever and unexpected weight change.   HENT: Negative for facial swelling and nosebleeds.    Eyes: Negative for photophobia and pain.   Respiratory: Negative for cough and shortness of breath.    Cardiovascular:  Negative for chest pain and leg swelling.   Gastrointestinal: Negative for abdominal pain, blood in stool, constipation and nausea.   Genitourinary: Negative for dysuria and hematuria.   Skin: Negative for color change and rash.   Neurological: Negative for seizures, weakness and headaches.   Hematological: Negative for adenopathy. Does not bruise/bleed easily.         Objective:      Physical Exam   Constitutional: She is oriented to person, place, and time. She appears well-developed and well-nourished. No distress.   HENT:   Mouth/Throat: Oropharynx is clear and moist and mucous membranes are normal. Mucous membranes are not pale. No oropharyngeal exudate.   Eyes: Conjunctivae are normal. No scleral icterus.   Neck: Normal range of motion. Neck supple. No thyroid mass (Thyroid area is non-tender) and no thyromegaly present.   Cardiovascular: Normal rate and regular rhythm.  Exam reveals no friction rub.    Pulmonary/Chest: Effort normal and breath sounds normal. No accessory muscle usage or stridor. No respiratory distress. She has no wheezes.   Abdominal: She exhibits no ascites and no mass. There is no hepatosplenomegaly. There is no tenderness.   Musculoskeletal: She exhibits no edema.   No varicosities noted.   Lymphadenopathy:     She has no cervical adenopathy.        Right: No supraclavicular adenopathy present.        Left: No supraclavicular adenopathy present.   Neurological: She is alert and oriented to person, place, and time.   Psychiatric: She has a normal mood and affect. Judgment and thought content normal. Cognition and memory are normal. She exhibits normal recent memory and normal remote memory.       Assessment:     1. 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 Encounter for antineoplastic chemotherapy    3. Squamous cell carcinoma of lungs, bilateral    4. Encounter for antineoplastic immunotherapy       Plan:   Restaging CT scans (6/23/17) - Interval decrease in size of index  pulmonary nodules along with interval decrease in size of numerous additional pulmonary nodules.    Labs reviewed.    Proceed with cycle 6 of KEYTRUDA as scheduled.     Plan restaging scans after total of 10 doses of KEYTRUDA.    Continue Remeron for insomnia.      RTC in 3 wks for next dose of Keytruda.

## 2017-08-03 DIAGNOSIS — C34.92 STAGE IV SQUAMOUS CELL CARCINOMA OF LEFT LUNG: Primary | ICD-10-CM

## 2017-08-07 ENCOUNTER — OFFICE VISIT (OUTPATIENT)
Dept: HEMATOLOGY/ONCOLOGY | Facility: CLINIC | Age: 67
End: 2017-08-07
Payer: MEDICARE

## 2017-08-07 ENCOUNTER — INFUSION (OUTPATIENT)
Dept: INFUSION THERAPY | Facility: HOSPITAL | Age: 67
End: 2017-08-07
Attending: INTERNAL MEDICINE
Payer: MEDICARE

## 2017-08-07 VITALS — RESPIRATION RATE: 18 BRPM

## 2017-08-07 VITALS
DIASTOLIC BLOOD PRESSURE: 76 MMHG | OXYGEN SATURATION: 97 % | SYSTOLIC BLOOD PRESSURE: 116 MMHG | HEART RATE: 77 BPM | BODY MASS INDEX: 16.9 KG/M2 | HEIGHT: 61 IN | WEIGHT: 89.5 LBS | TEMPERATURE: 98 F

## 2017-08-07 DIAGNOSIS — C78.02 MALIGNANT NEOPLASM METASTATIC TO LEFT LUNG: Primary | ICD-10-CM

## 2017-08-07 DIAGNOSIS — C34.92 STAGE IV SQUAMOUS CELL CARCINOMA OF LEFT LUNG: Primary | ICD-10-CM

## 2017-08-07 DIAGNOSIS — Z51.11 ENCOUNTER FOR ANTINEOPLASTIC CHEMOTHERAPY: ICD-10-CM

## 2017-08-07 PROCEDURE — 1125F AMNT PAIN NOTED PAIN PRSNT: CPT | Mod: S$GLB,,, | Performed by: INTERNAL MEDICINE

## 2017-08-07 PROCEDURE — 99499 UNLISTED E&M SERVICE: CPT | Mod: S$PBB,,, | Performed by: INTERNAL MEDICINE

## 2017-08-07 PROCEDURE — 96413 CHEMO IV INFUSION 1 HR: CPT

## 2017-08-07 PROCEDURE — 63600175 PHARM REV CODE 636 W HCPCS: Performed by: INTERNAL MEDICINE

## 2017-08-07 PROCEDURE — 99214 OFFICE O/P EST MOD 30 MIN: CPT | Mod: S$GLB,,, | Performed by: INTERNAL MEDICINE

## 2017-08-07 PROCEDURE — 96367 TX/PROPH/DG ADDL SEQ IV INF: CPT

## 2017-08-07 PROCEDURE — 99999 PR PBB SHADOW E&M-EST. PATIENT-LVL III: CPT | Mod: PBBFAC,,, | Performed by: INTERNAL MEDICINE

## 2017-08-07 PROCEDURE — 1159F MED LIST DOCD IN RCRD: CPT | Mod: S$GLB,,, | Performed by: INTERNAL MEDICINE

## 2017-08-07 PROCEDURE — 25000003 PHARM REV CODE 250: Performed by: INTERNAL MEDICINE

## 2017-08-07 RX ORDER — SODIUM CHLORIDE 0.9 % (FLUSH) 0.9 %
10 SYRINGE (ML) INJECTION
Status: DISCONTINUED | OUTPATIENT
Start: 2017-08-07 | End: 2017-08-07 | Stop reason: HOSPADM

## 2017-08-07 RX ORDER — HEPARIN 100 UNIT/ML
500 SYRINGE INTRAVENOUS
Status: DISCONTINUED | OUTPATIENT
Start: 2017-08-07 | End: 2017-08-07 | Stop reason: HOSPADM

## 2017-08-07 RX ADMIN — SODIUM CHLORIDE 200 MG: 0.9 INJECTION, SOLUTION INTRAVENOUS at 12:08

## 2017-08-07 RX ADMIN — DIPHENHYDRAMINE HYDROCHLORIDE 12.5 MG: 50 INJECTION INTRAMUSCULAR; INTRAVENOUS at 11:08

## 2017-08-07 RX ADMIN — SODIUM CHLORIDE: 0.9 INJECTION, SOLUTION INTRAVENOUS at 11:08

## 2017-08-07 RX ADMIN — HEPARIN SODIUM (PORCINE) LOCK FLUSH IV SOLN 100 UNIT/ML 500 UNITS: 100 SOLUTION at 12:08

## 2017-08-07 NOTE — PROGRESS NOTES
Subjective:       Patient ID: Andree Quiroz is a 66 y.o. female.    Chief Complaint: Lung Cancer    Lung Cancer   Associated symptoms include fatigue. Pertinent negatives include no abdominal pain, chest pain, coughing, fever, headaches, nausea, rash or weakness.      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  She had some nausea and dizziness at [employer], went to the ER, chest x-ray was ordered that showed a left suprahilar lesion.  CT of the chest with contrast 9/20/15 Showed 6.5 by 4.4 centimeter left upper lobe mass abutting the mediastinum.  CT-guided biopsy showed squamous cell carcinoma. PET scan did not show any extrathoracic disease. EBUS revealed - Station 4R (contralateral mediastinal lymph node) was negative. Station 7 (subcarinal) was positive for squamous cell carcinoma. Was felt not to be a surgical candidate.    Nov-Dec 2015 - Received concurrent ChemoRT with weekly carbo/taxol.  PET scan July 2016 showed progressive disease.    August 2016 - 2nd line chemotherapy with carboplatin and gemcitabine was started.    Here for follow-up  Denies any chest pain, difficulty breathing or hemoptysis.  No new pains.  No cough.  Continues to stay active.    CT chest 1/11/17 - Continued progression of disease with interval increase in size of bilateral pulmonary nodules.  PDL 1 testing shows 10% (low expression) TPS - tumor proportion score - scheduled to start KEYTRUDA.    CT chest 3/31 - Metastatic disease to the lung appears to have slightly progressed compared to the prior exam.    Started KEYTRUDA 4/3.    Got 1 dose Injectafer 6/5/17.    F/u with .    Review of Systems   Constitutional: Positive for fatigue. Negative for activity change, fever and unexpected weight change.   HENT: Negative for facial swelling and nosebleeds.    Eyes: Negative for photophobia and pain.   Respiratory: Negative for cough and shortness of breath.    Cardiovascular:  Negative for chest pain and leg swelling.   Gastrointestinal: Negative for abdominal pain, blood in stool, constipation and nausea.   Genitourinary: Negative for dysuria and hematuria.   Skin: Negative for color change and rash.   Neurological: Negative for seizures, weakness and headaches.   Hematological: Negative for adenopathy. Does not bruise/bleed easily.         Objective:      Physical Exam   Constitutional: She is oriented to person, place, and time. She appears well-developed and well-nourished. No distress.   HENT:   Mouth/Throat: Oropharynx is clear and moist and mucous membranes are normal. Mucous membranes are not pale. No oropharyngeal exudate.   Eyes: Conjunctivae are normal. No scleral icterus.   Neck: Normal range of motion. Neck supple. No thyroid mass (Thyroid area is non-tender) and no thyromegaly present.   Cardiovascular: Normal rate and regular rhythm.  Exam reveals no friction rub.    Pulmonary/Chest: Effort normal and breath sounds normal. No accessory muscle usage or stridor. No respiratory distress. She has no wheezes.   Abdominal: She exhibits no ascites and no mass. There is no hepatosplenomegaly. There is no tenderness.   Musculoskeletal: She exhibits no edema.   No varicosities noted.   Lymphadenopathy:     She has no cervical adenopathy.        Right: No supraclavicular adenopathy present.        Left: No supraclavicular adenopathy present.   Neurological: She is alert and oriented to person, place, and time.   Psychiatric: She has a normal mood and affect. Judgment and thought content normal. Cognition and memory are normal. She exhibits normal recent memory and normal remote memory.       Assessment:     1. 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 Encounter for antineoplastic chemotherapy       Plan:   Restaging CT scans (6/23/17) - Interval decrease in size of index pulmonary nodules along with interval decrease in size of numerous additional pulmonary nodules.    Labs  reviewed.    Proceed with cycle 7 of KEYTRUDA as scheduled.     Plan restaging scans after total of 10 doses of KEYTRUDA.    Continue Remeron for insomnia.      RTC in 3 wks for next dose of Keytruda.

## 2017-08-07 NOTE — NURSING
Pt Keytruda chemo well. No adverse reaction noted. Pt education reinforced on chemo regimen, side effects, what to expect, and when to call _[facility]_. Pt verbalized understanding. I reviewed pt calendar w/ pt and understanding verbalized. PAC deaccessed and flushed w/ NS and heparin per protocol.

## 2017-08-28 ENCOUNTER — INFUSION (OUTPATIENT)
Dept: INFUSION THERAPY | Facility: HOSPITAL | Age: 67
End: 2017-08-28
Attending: INTERNAL MEDICINE
Payer: MEDICARE

## 2017-08-28 ENCOUNTER — OFFICE VISIT (OUTPATIENT)
Dept: HEMATOLOGY/ONCOLOGY | Facility: CLINIC | Age: 67
End: 2017-08-28
Payer: MEDICARE

## 2017-08-28 VITALS
SYSTOLIC BLOOD PRESSURE: 131 MMHG | TEMPERATURE: 97 F | WEIGHT: 88 LBS | HEART RATE: 87 BPM | RESPIRATION RATE: 18 BRPM | BODY MASS INDEX: 16.62 KG/M2 | DIASTOLIC BLOOD PRESSURE: 84 MMHG | HEIGHT: 61 IN

## 2017-08-28 DIAGNOSIS — C34.92 STAGE IV SQUAMOUS CELL CARCINOMA OF LEFT LUNG: Primary | ICD-10-CM

## 2017-08-28 DIAGNOSIS — I25.10 CORONARY ARTERY DISEASE INVOLVING NATIVE CORONARY ARTERY OF NATIVE HEART WITHOUT ANGINA PECTORIS: ICD-10-CM

## 2017-08-28 DIAGNOSIS — G47.01 INSOMNIA DUE TO MEDICAL CONDITION: ICD-10-CM

## 2017-08-28 DIAGNOSIS — Z98.61 S/P PTCA (PERCUTANEOUS TRANSLUMINAL CORONARY ANGIOPLASTY): ICD-10-CM

## 2017-08-28 DIAGNOSIS — Z51.11 ENCOUNTER FOR ANTINEOPLASTIC CHEMOTHERAPY: ICD-10-CM

## 2017-08-28 DIAGNOSIS — C78.02 MALIGNANT NEOPLASM METASTATIC TO LEFT LUNG: ICD-10-CM

## 2017-08-28 DIAGNOSIS — C78.02 MALIGNANT NEOPLASM METASTATIC TO LEFT LUNG: Primary | ICD-10-CM

## 2017-08-28 PROCEDURE — 3008F BODY MASS INDEX DOCD: CPT | Mod: S$GLB,,, | Performed by: INTERNAL MEDICINE

## 2017-08-28 PROCEDURE — 25000003 PHARM REV CODE 250: Performed by: INTERNAL MEDICINE

## 2017-08-28 PROCEDURE — 96367 TX/PROPH/DG ADDL SEQ IV INF: CPT

## 2017-08-28 PROCEDURE — 1159F MED LIST DOCD IN RCRD: CPT | Mod: S$GLB,,, | Performed by: INTERNAL MEDICINE

## 2017-08-28 PROCEDURE — 1126F AMNT PAIN NOTED NONE PRSNT: CPT | Mod: S$GLB,,, | Performed by: INTERNAL MEDICINE

## 2017-08-28 PROCEDURE — 99214 OFFICE O/P EST MOD 30 MIN: CPT | Mod: S$GLB,,, | Performed by: INTERNAL MEDICINE

## 2017-08-28 PROCEDURE — 96413 CHEMO IV INFUSION 1 HR: CPT

## 2017-08-28 PROCEDURE — 99999 PR PBB SHADOW E&M-EST. PATIENT-LVL I: CPT | Mod: PBBFAC,,, | Performed by: INTERNAL MEDICINE

## 2017-08-28 PROCEDURE — 63600175 PHARM REV CODE 636 W HCPCS: Performed by: INTERNAL MEDICINE

## 2017-08-28 PROCEDURE — 99499 UNLISTED E&M SERVICE: CPT | Mod: S$PBB,,, | Performed by: INTERNAL MEDICINE

## 2017-08-28 RX ORDER — HEPARIN 100 UNIT/ML
500 SYRINGE INTRAVENOUS
Status: DISCONTINUED | OUTPATIENT
Start: 2017-08-28 | End: 2017-08-28 | Stop reason: HOSPADM

## 2017-08-28 RX ORDER — SODIUM CHLORIDE 0.9 % (FLUSH) 0.9 %
10 SYRINGE (ML) INJECTION
Status: DISCONTINUED | OUTPATIENT
Start: 2017-08-28 | End: 2017-08-28 | Stop reason: HOSPADM

## 2017-08-28 RX ADMIN — DIPHENHYDRAMINE HYDROCHLORIDE 12.5 MG: 50 INJECTION INTRAMUSCULAR; INTRAVENOUS at 11:08

## 2017-08-28 RX ADMIN — SODIUM CHLORIDE 200 MG: 0.9 INJECTION, SOLUTION INTRAVENOUS at 11:08

## 2017-08-28 RX ADMIN — SODIUM CHLORIDE: 0.9 INJECTION, SOLUTION INTRAVENOUS at 11:08

## 2017-08-28 RX ADMIN — HEPARIN SODIUM (PORCINE) LOCK FLUSH IV SOLN 100 UNIT/ML 500 UNITS: 100 SOLUTION at 12:08

## 2017-08-28 NOTE — PROGRESS NOTES
Subjective:       Patient ID: Andree Quiroz is a 67 y.o. female.    Chief Complaint: Results    Lung Cancer   Associated symptoms include fatigue. Pertinent negatives include no abdominal pain, chest pain, coughing, fever, headaches, nausea, rash or weakness.      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  She had some nausea and dizziness at [employer], went to the ER, chest x-ray was ordered that showed a left suprahilar lesion.  CT of the chest with contrast 9/20/15 Showed 6.5 by 4.4 centimeter left upper lobe mass abutting the mediastinum.  CT-guided biopsy showed squamous cell carcinoma. PET scan did not show any extrathoracic disease. EBUS revealed - Station 4R (contralateral mediastinal lymph node) was negative. Station 7 (subcarinal) was positive for squamous cell carcinoma. Was felt not to be a surgical candidate.    Nov-Dec 2015 - Received concurrent ChemoRT with weekly carbo/taxol.  PET scan July 2016 showed progressive disease.    August 2016 - 2nd line chemotherapy with carboplatin and gemcitabine was started.    Here for follow-up  Denies any chest pain, difficulty breathing or hemoptysis.  No new pains.  No cough.  Continues to stay active.    CT chest 1/11/17 - Continued progression of disease with interval increase in size of bilateral pulmonary nodules.  PDL 1 testing shows 10% (low expression) TPS - tumor proportion score - scheduled to start KEYTRUDA.    CT chest 3/31 - Metastatic disease to the lung appears to have slightly progressed compared to the prior exam.    Started KEYTRUDA 4/3.    Got 1 dose Injectafer 6/5/17.    She f/u with  for CAD. No active issues. Doing well.    Review of Systems   Constitutional: Positive for fatigue. Negative for activity change, fever and unexpected weight change.   HENT: Negative for facial swelling and nosebleeds.    Eyes: Negative for photophobia and pain.   Respiratory: Negative for cough and  shortness of breath.    Cardiovascular: Negative for chest pain and leg swelling.   Gastrointestinal: Negative for abdominal pain, blood in stool, constipation and nausea.   Genitourinary: Negative for dysuria and hematuria.   Skin: Negative for color change and rash.   Neurological: Negative for seizures, weakness and headaches.   Hematological: Negative for adenopathy. Does not bruise/bleed easily.         Objective:      Physical Exam   Constitutional: She is oriented to person, place, and time. She appears well-developed and well-nourished. No distress.   HENT:   Mouth/Throat: Oropharynx is clear and moist and mucous membranes are normal. Mucous membranes are not pale. No oropharyngeal exudate.   Eyes: Conjunctivae are normal. No scleral icterus.   Neck: Normal range of motion. Neck supple. No thyroid mass (Thyroid area is non-tender) and no thyromegaly present.   Cardiovascular: Normal rate and regular rhythm.  Exam reveals no friction rub.    Pulmonary/Chest: Effort normal and breath sounds normal. No accessory muscle usage or stridor. No respiratory distress. She has no wheezes.   Abdominal: She exhibits no ascites and no mass. There is no hepatosplenomegaly. There is no tenderness.   Musculoskeletal: She exhibits no edema.   No varicosities noted.   Lymphadenopathy:     She has no cervical adenopathy.        Right: No supraclavicular adenopathy present.        Left: No supraclavicular adenopathy present.   Neurological: She is alert and oriented to person, place, and time.   Psychiatric: She has a normal mood and affect. Judgment and thought content normal. Cognition and memory are normal. She exhibits normal recent memory and normal remote memory.       Assessment:     1. 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 Encounter for antineoplastic chemotherapy    3. Insomnia due to medical condition    4. S/P PTCA (percutaneous transluminal coronary angioplasty)    5. Coronary artery disease involving  native coronary artery of native heart without angina pectoris    6. Malignant neoplasm metastatic to left lung       Plan:   Restaging CT scans (6/23/17) - Interval decrease in size of index pulmonary nodules along with interval decrease in size of numerous additional pulmonary nodules.    Labs reviewed. Proceed with cycle 8 of KEYTRUDA as scheduled for today.    Plan restaging scans after total of 10 doses of KEYTRUDA.    Continue Remeron for insomnia.      CAD stable. F/u with .    RTC in 3 wks for next dose of Keytruda.

## 2017-09-18 ENCOUNTER — OFFICE VISIT (OUTPATIENT)
Dept: HEMATOLOGY/ONCOLOGY | Facility: CLINIC | Age: 67
End: 2017-09-18
Payer: MEDICARE

## 2017-09-18 ENCOUNTER — INFUSION (OUTPATIENT)
Dept: INFUSION THERAPY | Facility: HOSPITAL | Age: 67
End: 2017-09-18
Attending: INTERNAL MEDICINE
Payer: MEDICARE

## 2017-09-18 VITALS
SYSTOLIC BLOOD PRESSURE: 130 MMHG | BODY MASS INDEX: 16.31 KG/M2 | HEIGHT: 62 IN | HEART RATE: 85 BPM | OXYGEN SATURATION: 96 % | DIASTOLIC BLOOD PRESSURE: 72 MMHG | WEIGHT: 88.63 LBS

## 2017-09-18 DIAGNOSIS — Z51.11 ENCOUNTER FOR ANTINEOPLASTIC CHEMOTHERAPY: ICD-10-CM

## 2017-09-18 DIAGNOSIS — C78.02 MALIGNANT NEOPLASM METASTATIC TO LEFT LUNG: Primary | ICD-10-CM

## 2017-09-18 DIAGNOSIS — Z98.61 S/P PTCA (PERCUTANEOUS TRANSLUMINAL CORONARY ANGIOPLASTY): ICD-10-CM

## 2017-09-18 DIAGNOSIS — C34.92 STAGE IV SQUAMOUS CELL CARCINOMA OF LEFT LUNG: ICD-10-CM

## 2017-09-18 PROCEDURE — 25000003 PHARM REV CODE 250: Performed by: INTERNAL MEDICINE

## 2017-09-18 PROCEDURE — 63600175 PHARM REV CODE 636 W HCPCS: Performed by: INTERNAL MEDICINE

## 2017-09-18 PROCEDURE — A4216 STERILE WATER/SALINE, 10 ML: HCPCS | Performed by: INTERNAL MEDICINE

## 2017-09-18 PROCEDURE — 96413 CHEMO IV INFUSION 1 HR: CPT

## 2017-09-18 PROCEDURE — 99214 OFFICE O/P EST MOD 30 MIN: CPT | Mod: S$GLB,,, | Performed by: INTERNAL MEDICINE

## 2017-09-18 PROCEDURE — 3008F BODY MASS INDEX DOCD: CPT | Mod: S$GLB,,, | Performed by: INTERNAL MEDICINE

## 2017-09-18 PROCEDURE — 3078F DIAST BP <80 MM HG: CPT | Mod: S$GLB,,, | Performed by: INTERNAL MEDICINE

## 2017-09-18 PROCEDURE — 99499 UNLISTED E&M SERVICE: CPT | Mod: S$PBB,,, | Performed by: INTERNAL MEDICINE

## 2017-09-18 PROCEDURE — 1159F MED LIST DOCD IN RCRD: CPT | Mod: S$GLB,,, | Performed by: INTERNAL MEDICINE

## 2017-09-18 PROCEDURE — 3075F SYST BP GE 130 - 139MM HG: CPT | Mod: S$GLB,,, | Performed by: INTERNAL MEDICINE

## 2017-09-18 PROCEDURE — 1126F AMNT PAIN NOTED NONE PRSNT: CPT | Mod: S$GLB,,, | Performed by: INTERNAL MEDICINE

## 2017-09-18 PROCEDURE — 99999 PR PBB SHADOW E&M-EST. PATIENT-LVL III: CPT | Mod: PBBFAC,,, | Performed by: INTERNAL MEDICINE

## 2017-09-18 RX ORDER — SODIUM CHLORIDE 0.9 % (FLUSH) 0.9 %
10 SYRINGE (ML) INJECTION
Status: DISCONTINUED | OUTPATIENT
Start: 2017-09-18 | End: 2017-09-18 | Stop reason: HOSPADM

## 2017-09-18 RX ORDER — HEPARIN 100 UNIT/ML
500 SYRINGE INTRAVENOUS
Status: DISCONTINUED | OUTPATIENT
Start: 2017-09-18 | End: 2017-09-18 | Stop reason: HOSPADM

## 2017-09-18 RX ADMIN — SODIUM CHLORIDE, PRESERVATIVE FREE 10 ML: 5 INJECTION INTRAVENOUS at 11:09

## 2017-09-18 RX ADMIN — DIPHENHYDRAMINE HYDROCHLORIDE 12.5 MG: 50 INJECTION INTRAMUSCULAR; INTRAVENOUS at 10:09

## 2017-09-18 RX ADMIN — SODIUM CHLORIDE: 0.9 INJECTION, SOLUTION INTRAVENOUS at 10:09

## 2017-09-18 RX ADMIN — HEPARIN SODIUM (PORCINE) LOCK FLUSH IV SOLN 100 UNIT/ML 500 UNITS: 100 SOLUTION at 11:09

## 2017-09-18 RX ADMIN — SODIUM CHLORIDE 200 MG: 0.9 INJECTION, SOLUTION INTRAVENOUS at 10:09

## 2017-09-18 NOTE — PROGRESS NOTES
Subjective:       Patient ID: Andree Quiroz is a 67 y.o. female.    Chief Complaint: Lung Cancer    Lung Cancer   Associated symptoms include fatigue. Pertinent negatives include no abdominal pain, chest pain, coughing, fever, headaches, nausea, rash or weakness.      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  She had some nausea and dizziness at [employer], went to the ER, chest x-ray was ordered that showed a left suprahilar lesion.  CT of the chest with contrast 9/20/15 Showed 6.5 by 4.4 centimeter left upper lobe mass abutting the mediastinum.  CT-guided biopsy showed squamous cell carcinoma. PET scan did not show any extrathoracic disease. EBUS revealed - Station 4R (contralateral mediastinal lymph node) was negative. Station 7 (subcarinal) was positive for squamous cell carcinoma. Was felt not to be a surgical candidate.    Nov-Dec 2015 - Received concurrent ChemoRT with weekly carbo/taxol.  PET scan July 2016 showed progressive disease.    August 2016 - 2nd line chemotherapy with carboplatin and gemcitabine was started.    Here for follow-up  Denies any chest pain, difficulty breathing or hemoptysis.  No new pains.  No cough.  Continues to stay active.    CT chest 1/11/17 - Continued progression of disease with interval increase in size of bilateral pulmonary nodules.  PDL 1 testing shows 10% (low expression) TPS - tumor proportion score - scheduled to start KEYTRUDA.    CT chest 3/31 - Metastatic disease to the lung appears to have slightly progressed compared to the prior exam.    Started KEYTRUDA 4/3.    Got 1 dose Injectafer 6/5/17.    She f/u with  for CAD. No active issues. Doing well.    Review of Systems   Constitutional: Positive for fatigue. Negative for activity change, fever and unexpected weight change.   HENT: Negative for facial swelling and nosebleeds.    Eyes: Negative for photophobia and pain.   Respiratory: Negative for cough and  shortness of breath.    Cardiovascular: Negative for chest pain and leg swelling.   Gastrointestinal: Negative for abdominal pain, blood in stool, constipation and nausea.   Genitourinary: Negative for dysuria and hematuria.   Skin: Negative for color change and rash.   Neurological: Negative for seizures, weakness and headaches.   Hematological: Negative for adenopathy. Does not bruise/bleed easily.         Objective:      Physical Exam   Constitutional: She is oriented to person, place, and time. She appears well-developed and well-nourished. No distress.   HENT:   Mouth/Throat: Oropharynx is clear and moist and mucous membranes are normal. Mucous membranes are not pale. No oropharyngeal exudate.   Eyes: Conjunctivae are normal. No scleral icterus.   Neck: Normal range of motion. Neck supple. No thyroid mass (Thyroid area is non-tender) and no thyromegaly present.   Cardiovascular: Normal rate and regular rhythm.  Exam reveals no friction rub.    Pulmonary/Chest: Effort normal and breath sounds normal. No accessory muscle usage or stridor. No respiratory distress. She has no wheezes.   Abdominal: She exhibits no ascites and no mass. There is no hepatosplenomegaly. There is no tenderness.   Musculoskeletal: She exhibits no edema.   No varicosities noted.   Lymphadenopathy:     She has no cervical adenopathy.        Right: No supraclavicular adenopathy present.        Left: No supraclavicular adenopathy present.   Neurological: She is alert and oriented to person, place, and time.   Psychiatric: She has a normal mood and affect. Judgment and thought content normal. Cognition and memory are normal. She exhibits normal recent memory and normal remote memory.       Assessment:     1. Malignant neoplasm metastatic to left lung    2. 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 Encounter for antineoplastic chemotherapy    4. S/P PTCA (percutaneous transluminal coronary angioplasty)       Plan:   Restaging CT scans  (6/23/17) - Interval decrease in size of index pulmonary nodules along with interval decrease in size of numerous additional pulmonary nodules.    Labs reviewed. Proceed with cycle 9 of KEYTRUDA as scheduled for today.    Plan restaging scans after total of 10 doses of KEYTRUDA.    Continue Remeron for insomnia.      CAD stable. F/u with .    RTC in 3 wks for next dose of Keytruda.

## 2017-09-18 NOTE — NURSING
Pt tolerated Keytruda well. No adverse reaction noted. Pt education reinforced on chemo regimen, side effects, what to expect, and when to call Dr. Sandra_. Pt verbalized understanding. I reviewed pt calendar w/ pt and understanding verbalized. PAC deaccessed and flushed w/ NS and heparin per protocol.

## 2017-10-09 ENCOUNTER — OFFICE VISIT (OUTPATIENT)
Dept: HEMATOLOGY/ONCOLOGY | Facility: CLINIC | Age: 67
End: 2017-10-09
Payer: MEDICARE

## 2017-10-09 ENCOUNTER — INFUSION (OUTPATIENT)
Dept: INFUSION THERAPY | Facility: HOSPITAL | Age: 67
End: 2017-10-09
Attending: INTERNAL MEDICINE
Payer: MEDICARE

## 2017-10-09 VITALS
DIASTOLIC BLOOD PRESSURE: 79 MMHG | BODY MASS INDEX: 16.1 KG/M2 | TEMPERATURE: 98 F | SYSTOLIC BLOOD PRESSURE: 131 MMHG | RESPIRATION RATE: 97 BRPM | HEART RATE: 89 BPM | WEIGHT: 87.5 LBS | HEIGHT: 62 IN

## 2017-10-09 DIAGNOSIS — C34.92 STAGE IV SQUAMOUS CELL CARCINOMA OF LEFT LUNG: Primary | ICD-10-CM

## 2017-10-09 DIAGNOSIS — C78.02 MALIGNANT NEOPLASM METASTATIC TO LEFT LUNG: ICD-10-CM

## 2017-10-09 DIAGNOSIS — C78.02 MALIGNANT NEOPLASM METASTATIC TO LEFT LUNG: Primary | ICD-10-CM

## 2017-10-09 DIAGNOSIS — Z51.11 ENCOUNTER FOR ANTINEOPLASTIC CHEMOTHERAPY: ICD-10-CM

## 2017-10-09 DIAGNOSIS — I25.10 CORONARY ARTERY DISEASE INVOLVING NATIVE CORONARY ARTERY OF NATIVE HEART WITHOUT ANGINA PECTORIS: ICD-10-CM

## 2017-10-09 PROCEDURE — 99999 PR PBB SHADOW E&M-EST. PATIENT-LVL III: CPT | Mod: PBBFAC,,, | Performed by: INTERNAL MEDICINE

## 2017-10-09 PROCEDURE — 96367 TX/PROPH/DG ADDL SEQ IV INF: CPT

## 2017-10-09 PROCEDURE — 63600175 PHARM REV CODE 636 W HCPCS: Performed by: INTERNAL MEDICINE

## 2017-10-09 PROCEDURE — 99499 UNLISTED E&M SERVICE: CPT | Mod: S$PBB,,, | Performed by: INTERNAL MEDICINE

## 2017-10-09 PROCEDURE — 99214 OFFICE O/P EST MOD 30 MIN: CPT | Mod: S$GLB,,, | Performed by: INTERNAL MEDICINE

## 2017-10-09 PROCEDURE — 25000003 PHARM REV CODE 250: Performed by: INTERNAL MEDICINE

## 2017-10-09 PROCEDURE — 96413 CHEMO IV INFUSION 1 HR: CPT

## 2017-10-09 PROCEDURE — A4216 STERILE WATER/SALINE, 10 ML: HCPCS | Performed by: INTERNAL MEDICINE

## 2017-10-09 RX ORDER — SODIUM CHLORIDE 0.9 % (FLUSH) 0.9 %
10 SYRINGE (ML) INJECTION
Status: CANCELLED | OUTPATIENT
Start: 2017-10-09

## 2017-10-09 RX ORDER — HEPARIN 100 UNIT/ML
500 SYRINGE INTRAVENOUS
Status: CANCELLED | OUTPATIENT
Start: 2017-10-09

## 2017-10-09 RX ORDER — HEPARIN 100 UNIT/ML
500 SYRINGE INTRAVENOUS
Status: DISCONTINUED | OUTPATIENT
Start: 2017-10-09 | End: 2017-10-09 | Stop reason: HOSPADM

## 2017-10-09 RX ORDER — SODIUM CHLORIDE 0.9 % (FLUSH) 0.9 %
10 SYRINGE (ML) INJECTION
Status: DISCONTINUED | OUTPATIENT
Start: 2017-10-09 | End: 2017-10-09 | Stop reason: HOSPADM

## 2017-10-09 RX ADMIN — DIPHENHYDRAMINE HYDROCHLORIDE 12.5 MG: 50 INJECTION INTRAMUSCULAR; INTRAVENOUS at 11:10

## 2017-10-09 RX ADMIN — SODIUM CHLORIDE: 0.9 INJECTION, SOLUTION INTRAVENOUS at 11:10

## 2017-10-09 RX ADMIN — SODIUM CHLORIDE, PRESERVATIVE FREE 10 ML: 5 INJECTION INTRAVENOUS at 12:10

## 2017-10-09 RX ADMIN — SODIUM CHLORIDE 200 MG: 0.9 INJECTION, SOLUTION INTRAVENOUS at 12:10

## 2017-10-09 RX ADMIN — HEPARIN 500 UNITS: 100 SYRINGE at 12:10

## 2017-10-09 NOTE — NURSING
Pt tolerated Keytruda chemo well. No adverse reaction noted. Pt education reinforced on chemo regimen, side effects, what to expect, and when to call Dr. Sandra__. Pt verbalized understanding. I reviewed pt calendar w/ pt and understanding verbalized. PAC deaccessed and flushed w/ NS and heparin per protocol.

## 2017-10-09 NOTE — PROGRESS NOTES
Subjective:       Patient ID: Andree Quiroz is a 67 y.o. female.    Chief Complaint: No chief complaint on file.    Lung Cancer   Associated symptoms include fatigue. Pertinent negatives include no abdominal pain, chest pain, coughing, fever, headaches, nausea, rash or weakness.      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  She had some nausea and dizziness at [employer], went to the ER, chest x-ray was ordered that showed a left suprahilar lesion.  CT of the chest with contrast 9/20/15 Showed 6.5 by 4.4 centimeter left upper lobe mass abutting the mediastinum.  CT-guided biopsy showed squamous cell carcinoma. PET scan did not show any extrathoracic disease. EBUS revealed - Station 4R (contralateral mediastinal lymph node) was negative. Station 7 (subcarinal) was positive for squamous cell carcinoma. Was felt not to be a surgical candidate.    Nov-Dec 2015 - Received concurrent ChemoRT with weekly carbo/taxol.  PET scan July 2016 showed progressive disease.    August 2016 - 2nd line chemotherapy with carboplatin and gemcitabine was started.    Here for follow-up  Denies any chest pain, difficulty breathing or hemoptysis.  No new pains.  No cough.  Continues to stay active.    CT chest 1/11/17 - Continued progression of disease with interval increase in size of bilateral pulmonary nodules.  PDL 1 testing shows 10% (low expression) TPS - tumor proportion score - scheduled to start KEYTRUDA.    CT chest 3/31 - Metastatic disease to the lung appears to have slightly progressed compared to the prior exam.    Started KEYTRUDA 4/3.    Got 1 dose Injectafer 6/5/17.    She f/u with  for CAD. No active issues. Doing well.    Review of Systems   Constitutional: Positive for fatigue. Negative for activity change, fever and unexpected weight change.   HENT: Negative for facial swelling and nosebleeds.    Eyes: Negative for photophobia and pain.   Respiratory:  Negative for cough and shortness of breath.    Cardiovascular: Negative for chest pain and leg swelling.   Gastrointestinal: Negative for abdominal pain, blood in stool, constipation and nausea.   Genitourinary: Negative for dysuria and hematuria.   Skin: Negative for color change and rash.   Neurological: Negative for seizures, weakness and headaches.   Hematological: Negative for adenopathy. Does not bruise/bleed easily.         Objective:      Physical Exam   Constitutional: She is oriented to person, place, and time. She appears well-developed and well-nourished. No distress.   HENT:   Mouth/Throat: Oropharynx is clear and moist and mucous membranes are normal. Mucous membranes are not pale. No oropharyngeal exudate.   Eyes: Conjunctivae are normal. No scleral icterus.   Neck: Normal range of motion. Neck supple. No thyroid mass (Thyroid area is non-tender) and no thyromegaly present.   Cardiovascular: Normal rate and regular rhythm.  Exam reveals no friction rub.    Pulmonary/Chest: Effort normal and breath sounds normal. No accessory muscle usage or stridor. No respiratory distress. She has no wheezes.   Abdominal: She exhibits no ascites and no mass. There is no hepatosplenomegaly. There is no tenderness.   Musculoskeletal: She exhibits no edema.   No varicosities noted.   Lymphadenopathy:     She has no cervical adenopathy.        Right: No supraclavicular adenopathy present.        Left: No supraclavicular adenopathy present.   Neurological: She is alert and oriented to person, place, and time.   Psychiatric: She has a normal mood and affect. Judgment and thought content normal. Cognition and memory are normal. She exhibits normal recent memory and normal remote memory.       Assessment:     1. 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 Encounter for antineoplastic chemotherapy    4. Coronary artery disease involving native coronary artery of native  heart without angina pectoris       Plan:   Restaging CT scans (6/23/17) - Interval decrease in size of index pulmonary nodules along with interval decrease in size of numerous additional pulmonary nodules.    Labs reviewed. Proceed with cycle 10 of KEYTRUDA as scheduled for today.    Plan restaging scans before f/u.    Continue Remeron for insomnia.      CAD stable. F/u with .    RTC in 3 wks for next dose of Keytruda.

## 2017-10-30 ENCOUNTER — OFFICE VISIT (OUTPATIENT)
Dept: HEMATOLOGY/ONCOLOGY | Facility: CLINIC | Age: 67
End: 2017-10-30
Payer: MEDICARE

## 2017-10-30 ENCOUNTER — INFUSION (OUTPATIENT)
Dept: INFUSION THERAPY | Facility: HOSPITAL | Age: 67
End: 2017-10-30
Attending: INTERNAL MEDICINE
Payer: MEDICARE

## 2017-10-30 VITALS
TEMPERATURE: 98 F | HEIGHT: 62 IN | SYSTOLIC BLOOD PRESSURE: 132 MMHG | BODY MASS INDEX: 16.07 KG/M2 | DIASTOLIC BLOOD PRESSURE: 71 MMHG | WEIGHT: 87.31 LBS | HEART RATE: 73 BPM | OXYGEN SATURATION: 97 %

## 2017-10-30 VITALS — RESPIRATION RATE: 18 BRPM

## 2017-10-30 DIAGNOSIS — C78.02 MALIGNANT NEOPLASM METASTATIC TO LEFT LUNG: ICD-10-CM

## 2017-10-30 DIAGNOSIS — C34.92 STAGE IV SQUAMOUS CELL CARCINOMA OF LEFT LUNG: Primary | ICD-10-CM

## 2017-10-30 DIAGNOSIS — I25.10 CORONARY ARTERY DISEASE INVOLVING NATIVE CORONARY ARTERY OF NATIVE HEART WITHOUT ANGINA PECTORIS: ICD-10-CM

## 2017-10-30 DIAGNOSIS — Z51.11 ENCOUNTER FOR ANTINEOPLASTIC CHEMOTHERAPY: ICD-10-CM

## 2017-10-30 DIAGNOSIS — C78.02 MALIGNANT NEOPLASM METASTATIC TO LEFT LUNG: Primary | ICD-10-CM

## 2017-10-30 PROCEDURE — 63600175 PHARM REV CODE 636 W HCPCS: Performed by: INTERNAL MEDICINE

## 2017-10-30 PROCEDURE — 96367 TX/PROPH/DG ADDL SEQ IV INF: CPT

## 2017-10-30 PROCEDURE — 99999 PR PBB SHADOW E&M-EST. PATIENT-LVL III: CPT | Mod: PBBFAC,,, | Performed by: INTERNAL MEDICINE

## 2017-10-30 PROCEDURE — 96413 CHEMO IV INFUSION 1 HR: CPT

## 2017-10-30 PROCEDURE — 25000003 PHARM REV CODE 250: Performed by: INTERNAL MEDICINE

## 2017-10-30 PROCEDURE — 99499 UNLISTED E&M SERVICE: CPT | Mod: S$PBB,,, | Performed by: INTERNAL MEDICINE

## 2017-10-30 PROCEDURE — 99215 OFFICE O/P EST HI 40 MIN: CPT | Mod: S$GLB,,, | Performed by: INTERNAL MEDICINE

## 2017-10-30 RX ORDER — HEPARIN 100 UNIT/ML
500 SYRINGE INTRAVENOUS
Status: DISCONTINUED | OUTPATIENT
Start: 2017-10-30 | End: 2017-10-30 | Stop reason: HOSPADM

## 2017-10-30 RX ORDER — SODIUM CHLORIDE 0.9 % (FLUSH) 0.9 %
10 SYRINGE (ML) INJECTION
Status: CANCELLED | OUTPATIENT
Start: 2017-10-30

## 2017-10-30 RX ORDER — SODIUM CHLORIDE 0.9 % (FLUSH) 0.9 %
10 SYRINGE (ML) INJECTION
Status: DISCONTINUED | OUTPATIENT
Start: 2017-10-30 | End: 2017-10-30 | Stop reason: HOSPADM

## 2017-10-30 RX ORDER — HEPARIN 100 UNIT/ML
500 SYRINGE INTRAVENOUS
Status: CANCELLED | OUTPATIENT
Start: 2017-10-30

## 2017-10-30 RX ADMIN — SODIUM CHLORIDE: 0.9 INJECTION, SOLUTION INTRAVENOUS at 11:10

## 2017-10-30 RX ADMIN — HEPARIN 500 UNITS: 100 SYRINGE at 12:10

## 2017-10-30 RX ADMIN — SODIUM CHLORIDE 200 MG: 0.9 INJECTION, SOLUTION INTRAVENOUS at 11:10

## 2017-10-30 RX ADMIN — DIPHENHYDRAMINE HYDROCHLORIDE 12.5 MG: 50 INJECTION INTRAMUSCULAR; INTRAVENOUS at 11:10

## 2017-10-30 NOTE — PROGRESS NOTES
Subjective:       Patient ID: Andree Quiroz is a 67 y.o. female.    Chief Complaint: No chief complaint on file.    Lung Cancer   Associated symptoms include fatigue. Pertinent negatives include no abdominal pain, chest pain, coughing, fever, headaches, nausea, rash or weakness.      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  She had some nausea and dizziness at [employer], went to the ER, chest x-ray was ordered that showed a left suprahilar lesion.  CT of the chest with contrast 9/20/15 Showed 6.5 by 4.4 centimeter left upper lobe mass abutting the mediastinum.  CT-guided biopsy showed squamous cell carcinoma. PET scan did not show any extrathoracic disease. EBUS revealed - Station 4R (contralateral mediastinal lymph node) was negative. Station 7 (subcarinal) was positive for squamous cell carcinoma. Was felt not to be a surgical candidate.    Nov-Dec 2015 - Received concurrent ChemoRT with weekly carbo/taxol.  PET scan July 2016 showed progressive disease.    August 2016 - 2nd line chemotherapy with carboplatin and gemcitabine was started.    Here for follow-up  Denies any chest pain, difficulty breathing or hemoptysis.  No new pains.  No cough.  Continues to stay active.    CT chest 1/11/17 - Continued progression of disease with interval increase in size of bilateral pulmonary nodules.  PDL 1 testing shows 10% (low expression) TPS - tumor proportion score - scheduled to start KEYTRUDA.    CT chest 3/31 - Metastatic disease to the lung appears to have slightly progressed compared to the prior exam.    Started KEYTRUDA 4/3.    Got 1 dose Injectafer 6/5/17.    She f/u with  for CAD. No active issues. Doing well.    Review of Systems   Constitutional: Positive for fatigue. Negative for activity change, fever and unexpected weight change.   HENT: Negative for facial swelling and nosebleeds.    Eyes: Negative for photophobia and pain.   Respiratory:  Negative for cough and shortness of breath.    Cardiovascular: Negative for chest pain and leg swelling.   Gastrointestinal: Negative for abdominal pain, blood in stool, constipation and nausea.   Genitourinary: Negative for dysuria and hematuria.   Skin: Negative for color change and rash.   Neurological: Negative for seizures, weakness and headaches.   Hematological: Negative for adenopathy. Does not bruise/bleed easily.         Objective:      Physical Exam   Constitutional: She is oriented to person, place, and time. She appears well-developed and well-nourished. No distress.   HENT:   Mouth/Throat: Oropharynx is clear and moist and mucous membranes are normal. Mucous membranes are not pale. No oropharyngeal exudate.   Eyes: Conjunctivae are normal. No scleral icterus.   Neck: Normal range of motion. Neck supple. No thyroid mass (Thyroid area is non-tender) and no thyromegaly present.   Cardiovascular: Normal rate and regular rhythm.  Exam reveals no friction rub.    Pulmonary/Chest: Effort normal and breath sounds normal. No accessory muscle usage or stridor. No respiratory distress. She has no wheezes.   Abdominal: She exhibits no ascites and no mass. There is no hepatosplenomegaly. There is no tenderness.   Musculoskeletal: She exhibits no edema.   No varicosities noted.   Lymphadenopathy:     She has no cervical adenopathy.        Right: No supraclavicular adenopathy present.        Left: No supraclavicular adenopathy present.   Neurological: She is alert and oriented to person, place, and time.   Psychiatric: She has a normal mood and affect. Judgment and thought content normal. Cognition and memory are normal. She exhibits normal recent memory and normal remote memory.       Assessment:     1. 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 Encounter for antineoplastic chemotherapy    4. Coronary artery disease involving native coronary artery of native  heart without angina pectoris       Plan:   Restaging CT scans (10/27/17) - reviewed independently and with the patient in detail.  There is non-clinical insignificant increase in size of existing pulmonary nodules and questionable new nodules which are not clinically significant.  There are no new areas of disease activity in the bones or liver.    Based on this I recommended continuing KEYTRUDA.    Labs reviewed. Proceed with cycle 11 of KEYTRUDA as scheduled for today.    Continue Remeron for insomnia.      CAD stable. F/u with .    RTC in 3 wks for next dose of Keytruda.    Plan restaging scans in February/March 2017.

## 2017-11-10 ENCOUNTER — TELEPHONE (OUTPATIENT)
Dept: HEMATOLOGY/ONCOLOGY | Facility: CLINIC | Age: 67
End: 2017-11-10

## 2017-11-10 NOTE — TELEPHONE ENCOUNTER
Pt made aware that  is out of the office. Encouraged pt to notify PCP for further care. Pt verbalized understanding. Denies further needs now.     ----- Message from Fang Mcdonough sent at 11/10/2017  8:41 AM CST -----  Contact: Self [phone]  Calling to talk to nurse concerning she is not feeling well she has a severe cough. Please advice

## 2017-11-13 ENCOUNTER — TELEPHONE (OUTPATIENT)
Dept: FAMILY MEDICINE | Facility: CLINIC | Age: 67
End: 2017-11-13

## 2017-11-13 ENCOUNTER — OFFICE VISIT (OUTPATIENT)
Dept: FAMILY MEDICINE | Facility: CLINIC | Age: 67
End: 2017-11-13
Payer: MEDICARE

## 2017-11-13 VITALS
DIASTOLIC BLOOD PRESSURE: 60 MMHG | OXYGEN SATURATION: 97 % | HEART RATE: 85 BPM | BODY MASS INDEX: 15.3 KG/M2 | WEIGHT: 83.13 LBS | TEMPERATURE: 98 F | HEIGHT: 62 IN | SYSTOLIC BLOOD PRESSURE: 103 MMHG

## 2017-11-13 DIAGNOSIS — J06.9 URI WITH COUGH AND CONGESTION: Primary | ICD-10-CM

## 2017-11-13 DIAGNOSIS — C34.92 STAGE IV SQUAMOUS CELL CARCINOMA OF LEFT LUNG: ICD-10-CM

## 2017-11-13 DIAGNOSIS — R06.2 WHEEZING: ICD-10-CM

## 2017-11-13 PROCEDURE — 99499 UNLISTED E&M SERVICE: CPT | Mod: S$PBB,,, | Performed by: NURSE PRACTITIONER

## 2017-11-13 PROCEDURE — 99213 OFFICE O/P EST LOW 20 MIN: CPT | Mod: S$GLB,,, | Performed by: NURSE PRACTITIONER

## 2017-11-13 PROCEDURE — 99999 PR PBB SHADOW E&M-EST. PATIENT-LVL III: CPT | Mod: PBBFAC,,, | Performed by: NURSE PRACTITIONER

## 2017-11-13 RX ORDER — PREDNISONE 20 MG/1
40 TABLET ORAL DAILY
Qty: 10 TABLET | Refills: 0 | Status: SHIPPED | OUTPATIENT
Start: 2017-11-13 | End: 2017-11-18

## 2017-11-13 RX ORDER — LEVOFLOXACIN 500 MG/1
500 TABLET, FILM COATED ORAL DAILY
Qty: 7 TABLET | Refills: 0 | Status: SHIPPED | OUTPATIENT
Start: 2017-11-13 | End: 2017-11-20

## 2017-11-13 NOTE — TELEPHONE ENCOUNTER
----- Message from Jenny Tyson sent at 11/13/2017  3:50 PM CST -----  Contact: [phone]/self  Patient requesting to speak with you regarding her results and prescription. Please call and advise.

## 2017-11-13 NOTE — TELEPHONE ENCOUNTER
Consulted with Dr. Sandra and spoke with patient on phone - advised patient that I sent in prescription for Levaquin and Prednisone for current URI after consulting with Dr. Sandra.  Patient verbalizes understanding.

## 2017-11-13 NOTE — PROGRESS NOTES
Subjective:       Patient ID: Andree Quiroz is a 67 y.o. female.    Chief Complaint: URI (started Tuesday last week with coughing,nasal congestion,little dizziness,hard  to eat,light headaches when coughing)    Patient is here today with report she started with URI s/s for the past 7 days.  Patient does have Stage 4 Lung Cancer that is being followed by Dr. Sandra- recent CT chest 10/27/17. Patient is afebrile, with stable heart rate and O2 sats 97% RA.  She does report increased wheezing.      URI    The current episode started in the past 7 days. The problem has been gradually worsening. There has been no fever. Associated symptoms include chest pain, congestion, coughing, headaches, rhinorrhea, sinus pain and wheezing. Pertinent negatives include no abdominal pain, diarrhea, dysuria, ear pain, nausea, rash, sneezing, sore throat or vomiting. Associated symptoms comments: Cough is productive with white, thick mucus up.  Nasal discharge present - white. Reports pain to chest wall with coughing. Treatments tried: Robitussin DM with Tylenol. The treatment provided moderate relief.       Previous Medications    ACETAMINOPHEN (TYLENOL EXTRA STRENGTH ORAL)    Take 1 tablet by mouth once daily at 6am.    ASPIRIN (ECOTRIN) 81 MG EC TABLET    Take 81 mg by mouth once daily.     ATORVASTATIN (LIPITOR) 20 MG TABLET    Take 0.5 tablets (10 mg total) by mouth every evening.    CARVEDILOL (COREG) 12.5 MG TABLET    Take 12.5 mg by mouth 2 (two) times daily.     CLOPIDOGREL (PLAVIX) 75 MG TABLET    Take 1 tablet (75 mg total) by mouth once daily.    PEMBROLIZUMAB (KEYTRUDA IV)    Inject into the vein. Treatment every 3 weeks       Past Medical History:   Diagnosis Date    Acute myocardial infarction of anterior wall 04/26/2012    2-stents 04/26/12 and 1 setnt 07/24/12. - seen by Dr. Lopez    Coronary arteriosclerosis     was with John (retired) and now with Bhavya    Depression     Disorder of vein      Hyperlipidemia     Hypertension     Insomnia     Lateral epicondylitis     Lung cancer     Treated by Dr. Sandra    Mammogram abnormal     Osteoarthritis     Pityriasis versicolor        Past Surgical History:   Procedure Laterality Date    CORONARY ANGIOPLASTY  04/2012    x3 stents    HYSTERECTOMY  1979    partial unsure when last PAP was       Family History   Problem Relation Age of Onset    Heart disease Father      heart attack    Hypertension Mother        Social History     Social History    Marital status:      Spouse name: N/A    Number of children: N/A    Years of education: N/A     Social History Main Topics    Smoking status: Former Smoker     Packs/day: 0.10     Years: 30.00    Smokeless tobacco: Never Used      Comment: Pt down to 1 cigarette daily    Alcohol use No    Drug use: No    Sexual activity: Not Asked     Other Topics Concern    None     Social History Narrative    None       Review of Systems   Constitutional: Negative for appetite change, chills, fatigue, fever and unexpected weight change.   HENT: Positive for congestion, rhinorrhea, sinus pain and sinus pressure. Negative for ear pain, mouth sores, nosebleeds, postnasal drip, sneezing, sore throat, trouble swallowing and voice change.    Eyes: Negative for photophobia, pain, discharge, redness, itching and visual disturbance.   Respiratory: Positive for cough and wheezing. Negative for chest tightness and shortness of breath.    Cardiovascular: Positive for chest pain. Negative for palpitations and leg swelling.   Gastrointestinal: Negative for abdominal pain, blood in stool, constipation, diarrhea, nausea and vomiting.   Genitourinary: Negative for dysuria, frequency, hematuria and urgency.   Musculoskeletal: Negative for arthralgias, back pain, joint swelling and myalgias.   Skin: Negative for color change and rash.   Allergic/Immunologic: Negative for immunocompromised state.   Neurological: Positive for  "headaches. Negative for dizziness, seizures, syncope and weakness.   Hematological: Negative for adenopathy. Does not bruise/bleed easily.   Psychiatric/Behavioral: Negative for agitation, dysphoric mood, sleep disturbance and suicidal ideas. The patient is not nervous/anxious.          Objective:     Vitals:    11/13/17 1047   BP: 103/60   BP Location: Right arm   Patient Position: Sitting   BP Method: Medium (Manual)   Pulse: 85   Temp: 97.7 °F (36.5 °C)   TempSrc: Oral   SpO2: 97%   Weight: 37.7 kg (83 lb 1.8 oz)   Height: 5' 1.5" (1.562 m)          Physical Exam   Constitutional: She is oriented to person, place, and time. She appears well-developed and well-nourished. No distress.   Body mass index is 19.18 kg/(m^2).     HENT:   Head: Normocephalic and atraumatic.   Right Ear: External ear normal.   Left Ear: External ear normal.   Nose: Mucosal edema present.   Mouth/Throat: Posterior oropharyngeal erythema present. No oropharyngeal exudate or posterior oropharyngeal edema.   Eyes: EOM are normal. Pupils are equal, round, and reactive to light.   Neck: Normal range of motion. Neck supple. No tracheal deviation present. No thyromegaly present.   Cardiovascular: Normal rate, regular rhythm and normal heart sounds.    No murmur heard.  Pulmonary/Chest: Effort normal. No accessory muscle usage. No tachypnea. No respiratory distress. She has wheezes in the left upper field, the left middle field and the left lower field.   Pulse ox 97% RA   Abdominal: Soft. She exhibits no distension.   Musculoskeletal: Normal range of motion. She exhibits no edema.   Lymphadenopathy:     She has no cervical adenopathy.   Neurological: She is alert and oriented to person, place, and time. No cranial nerve deficit. Coordination normal.   Skin: Skin is warm and dry. No rash noted. She is not diaphoretic.   Psychiatric: She has a normal mood and affect.         Assessment:         ICD-10-CM ICD-9-CM   1. URI with cough and congestion " J06.9 465.9   2. Wheezing R06.2 786.07   3. Stage IV squamous cell carcinoma of left lung C34.92 162.9       Plan:       URI with cough and congestion  -  Advised patient that due to active lung cancer and current symptoms - will consult with Oncologist, Dr. Sandra, for treatment recommendations based on symptoms and physical exam findings - will call patient with treatment plan today.    Wheezing    Stage IV squamous cell carcinoma of left lung           Patient's Medications   New Prescriptions    LEVOFLOXACIN (LEVAQUIN) 500 MG TABLET    Take 1 tablet (500 mg total) by mouth once daily.    PREDNISONE (DELTASONE) 20 MG TABLET    Take 2 tablets (40 mg total) by mouth once daily.   Previous Medications    ACETAMINOPHEN (TYLENOL EXTRA STRENGTH ORAL)    Take 1 tablet by mouth once daily at 6am.    ASPIRIN (ECOTRIN) 81 MG EC TABLET    Take 81 mg by mouth once daily.     ATORVASTATIN (LIPITOR) 20 MG TABLET    Take 0.5 tablets (10 mg total) by mouth every evening.    CARVEDILOL (COREG) 12.5 MG TABLET    Take 12.5 mg by mouth 2 (two) times daily.     CLOPIDOGREL (PLAVIX) 75 MG TABLET    Take 1 tablet (75 mg total) by mouth once daily.    PEMBROLIZUMAB (KEYTRUDA IV)    Inject into the vein. Treatment every 3 weeks   Modified Medications    No medications on file   Discontinued Medications    No medications on file

## 2017-11-13 NOTE — TELEPHONE ENCOUNTER
Spoke with patient on phone - advised I had messaged Dr. Sandra and he wanted patient on antibiotic and steroid [facility]. Patient verbalizes understanding.

## 2017-11-13 NOTE — TELEPHONE ENCOUNTER
----- Message from Saul Sandra MD sent at 11/13/2017  3:34 PM CST -----  Levaquin is good. Prednisone 40 mg for 5 days should be sufficient.  Thank you    ----- Message -----  From: Bertha Sherwood NP  Sent: 11/13/2017   3:04 PM  To: Saul Sandra MD    Levaquin 500 mg daily for 7 days with a Prednisone 10 mg tapered dose pack (60, 50, 40, etc) or do you prefer Prednisone 40 mg x 5 days?  Anything specific that you prefer?    Bertha    ----- Message -----  From: Saul Sandra MD  Sent: 11/13/2017   2:23 PM  To: Bertha Sherwood NP    Can you prescribe antibiotics and a course of steroids.  Thanks    ----- Message -----  From: Bertha Sherwood NP  Sent: 11/13/2017   2:13 PM  To: Saul Sandra MD    Hi Dr. Sandra,    This is a patient of yours - Stage 4 lung cancer.  She came to my office today with URI s/s for the past week - reports congestion with productive cough - thick white mucus.  She was afebrile with temp 97.7, HR 85 and pulse ox 97% RA.  She had bilateral inspiratory wheezes but definitely more prominent to left lung fields.  She also complained of left chest wall pain with deep inspirations.  I know she just had CT Chest 10/27th and the left lung mass is present and progressing.      Would you like me to get a CXR to check for pneumonia or go ahead and just treat with antibiotic Or patient states that you were thinking of putting her on steroids?    JUSTIN Stone

## 2017-11-14 ENCOUNTER — OFFICE VISIT (OUTPATIENT)
Dept: CARDIOLOGY | Facility: CLINIC | Age: 67
End: 2017-11-14
Payer: MEDICARE

## 2017-11-14 VITALS
HEART RATE: 70 BPM | WEIGHT: 84.63 LBS | HEIGHT: 62 IN | BODY MASS INDEX: 15.57 KG/M2 | DIASTOLIC BLOOD PRESSURE: 80 MMHG | SYSTOLIC BLOOD PRESSURE: 136 MMHG

## 2017-11-14 DIAGNOSIS — E78.5 HYPERLIPIDEMIA, UNSPECIFIED HYPERLIPIDEMIA TYPE: ICD-10-CM

## 2017-11-14 DIAGNOSIS — C34.92 STAGE IV SQUAMOUS CELL CARCINOMA OF LEFT LUNG: ICD-10-CM

## 2017-11-14 DIAGNOSIS — Z72.0 TOBACCO ABUSE: ICD-10-CM

## 2017-11-14 DIAGNOSIS — I25.2 OLD MI (MYOCARDIAL INFARCTION): ICD-10-CM

## 2017-11-14 DIAGNOSIS — I25.10 CORONARY ARTERY DISEASE INVOLVING NATIVE CORONARY ARTERY OF NATIVE HEART WITHOUT ANGINA PECTORIS: Primary | ICD-10-CM

## 2017-11-14 DIAGNOSIS — I10 ESSENTIAL HYPERTENSION: ICD-10-CM

## 2017-11-14 DIAGNOSIS — Z98.61 S/P PTCA (PERCUTANEOUS TRANSLUMINAL CORONARY ANGIOPLASTY): ICD-10-CM

## 2017-11-14 PROCEDURE — 93000 ELECTROCARDIOGRAM COMPLETE: CPT | Mod: S$GLB,,, | Performed by: INTERNAL MEDICINE

## 2017-11-14 PROCEDURE — 99213 OFFICE O/P EST LOW 20 MIN: CPT | Mod: S$GLB,,, | Performed by: INTERNAL MEDICINE

## 2017-11-14 PROCEDURE — 99499 UNLISTED E&M SERVICE: CPT | Mod: S$PBB,,, | Performed by: INTERNAL MEDICINE

## 2017-11-14 PROCEDURE — 99999 PR PBB SHADOW E&M-EST. PATIENT-LVL III: CPT | Mod: PBBFAC,,, | Performed by: INTERNAL MEDICINE

## 2017-11-14 NOTE — PROGRESS NOTES
Subjective:   Patient ID:  Andree Quiroz is a 67 y.o. female     Chief Complaint   Patient presents with    Hypertension       HPI: c/o cough and cold with white sputum.  Saw PCP Dr Marilyn Sherwood in [place] yesterday.   She spoke with Dr Sandra.  Pt was begun on steroids and antibiotics.  Pt is also taking Keytruda.  No recurrent dizziness since cutting back on antihypertensives.  Oncologist is Dr Sandra.  Pt had a recent CT chest  Review of Systems   Cardiovascular: Positive for chest pain (mild retrosternal sticking discomfort with exeriton.). Negative for claudication, dyspnea on exertion, irregular heartbeat, leg swelling, near-syncope, orthopnea, palpitations and syncope.     The sticking discomfort in the chest is mostly when coughing.    No longer smoking  Objective:   Physical Exam   Constitutional: She is oriented to person, place, and time. She appears well-developed and well-nourished. No distress.   HENT:   Head: Normocephalic.   Eyes: No scleral icterus.   Neck: No JVD present.   Cardiovascular: Normal rate, regular rhythm and normal heart sounds.  Exam reveals no gallop and no friction rub.    No murmur heard.  Pulmonary/Chest: Effort normal. No stridor. She has wheezes (minor).   Musculoskeletal: She exhibits no edema.   Neurological: She is alert and oriented to person, place, and time.   Skin: Skin is warm and dry. She is not diaphoretic.   Psychiatric: She has a normal mood and affect. Her behavior is normal. Judgment and thought content normal.   Vitals reviewed.      ECG: NSR with PVC, WNL    Wt up slightly    Recent CBC and CMP reviewed-- lab OK except for mild anemia.  Assessment:     1. Coronary artery disease involving native coronary artery of native heart without angina pectoris    2. Hyperlipidemia, unspecified hyperlipidemia type    3. Essential hypertension    4. Old MI (myocardial infarction)    5. Tobacco abuse    6. Stage IV squamous cell carcinoma of left lung    7. S/P PTCA  (percutaneous transluminal coronary angioplasty)      The sticking chest pain when coughing is probably non-cardiac in etiology.  Acute bronchitis -- pt will begin steroids and antibiotics today  Plan:   Continue same medical regimen.  Return to clinic in 6 months with ECG.  Eat more

## 2017-11-20 ENCOUNTER — OFFICE VISIT (OUTPATIENT)
Dept: HEMATOLOGY/ONCOLOGY | Facility: CLINIC | Age: 67
End: 2017-11-20
Payer: MEDICARE

## 2017-11-20 ENCOUNTER — INFUSION (OUTPATIENT)
Dept: INFUSION THERAPY | Facility: HOSPITAL | Age: 67
End: 2017-11-20
Attending: INTERNAL MEDICINE
Payer: MEDICARE

## 2017-11-20 VITALS
TEMPERATURE: 97 F | SYSTOLIC BLOOD PRESSURE: 146 MMHG | OXYGEN SATURATION: 95 % | DIASTOLIC BLOOD PRESSURE: 80 MMHG | HEART RATE: 67 BPM | BODY MASS INDEX: 16.11 KG/M2 | WEIGHT: 85.31 LBS | HEIGHT: 61 IN

## 2017-11-20 DIAGNOSIS — C78.02 MALIGNANT NEOPLASM METASTATIC TO LEFT LUNG: Primary | ICD-10-CM

## 2017-11-20 DIAGNOSIS — I25.10 CORONARY ARTERY DISEASE INVOLVING NATIVE CORONARY ARTERY OF NATIVE HEART WITHOUT ANGINA PECTORIS: ICD-10-CM

## 2017-11-20 DIAGNOSIS — E87.6 HYPOKALEMIA: ICD-10-CM

## 2017-11-20 DIAGNOSIS — C34.92 STAGE IV SQUAMOUS CELL CARCINOMA OF LEFT LUNG: Primary | ICD-10-CM

## 2017-11-20 DIAGNOSIS — Z51.12 ENCOUNTER FOR ANTINEOPLASTIC IMMUNOTHERAPY: ICD-10-CM

## 2017-11-20 PROCEDURE — 99999 PR PBB SHADOW E&M-EST. PATIENT-LVL III: CPT | Mod: PBBFAC,,, | Performed by: INTERNAL MEDICINE

## 2017-11-20 PROCEDURE — 96413 CHEMO IV INFUSION 1 HR: CPT

## 2017-11-20 PROCEDURE — 96367 TX/PROPH/DG ADDL SEQ IV INF: CPT

## 2017-11-20 PROCEDURE — 63600175 PHARM REV CODE 636 W HCPCS: Performed by: INTERNAL MEDICINE

## 2017-11-20 PROCEDURE — 99499 UNLISTED E&M SERVICE: CPT | Mod: S$PBB,,, | Performed by: INTERNAL MEDICINE

## 2017-11-20 PROCEDURE — 25000003 PHARM REV CODE 250: Performed by: INTERNAL MEDICINE

## 2017-11-20 PROCEDURE — 99214 OFFICE O/P EST MOD 30 MIN: CPT | Mod: S$GLB,,, | Performed by: INTERNAL MEDICINE

## 2017-11-20 RX ORDER — POTASSIUM CHLORIDE 20 MEQ/1
20 TABLET, EXTENDED RELEASE ORAL DAILY
Qty: 14 TABLET | Refills: 0 | Status: SHIPPED | OUTPATIENT
Start: 2017-11-20 | End: 2017-12-11 | Stop reason: SDUPTHER

## 2017-11-20 RX ORDER — SODIUM CHLORIDE 0.9 % (FLUSH) 0.9 %
10 SYRINGE (ML) INJECTION
Status: DISCONTINUED | OUTPATIENT
Start: 2017-11-20 | End: 2017-11-20 | Stop reason: HOSPADM

## 2017-11-20 RX ORDER — HEPARIN 100 UNIT/ML
500 SYRINGE INTRAVENOUS
Status: DISCONTINUED | OUTPATIENT
Start: 2017-11-20 | End: 2017-11-20 | Stop reason: HOSPADM

## 2017-11-20 RX ORDER — SODIUM CHLORIDE 0.9 % (FLUSH) 0.9 %
10 SYRINGE (ML) INJECTION
Status: CANCELLED | OUTPATIENT
Start: 2017-11-20

## 2017-11-20 RX ORDER — HEPARIN 100 UNIT/ML
500 SYRINGE INTRAVENOUS
Status: CANCELLED | OUTPATIENT
Start: 2017-11-20

## 2017-11-20 RX ADMIN — SODIUM CHLORIDE: 900 INJECTION, SOLUTION INTRAVENOUS at 11:11

## 2017-11-20 RX ADMIN — DIPHENHYDRAMINE HYDROCHLORIDE 12.5 MG: 50 INJECTION INTRAMUSCULAR; INTRAVENOUS at 11:11

## 2017-11-20 RX ADMIN — HEPARIN SODIUM (PORCINE) LOCK FLUSH IV SOLN 100 UNIT/ML 500 UNITS: 100 SOLUTION at 12:11

## 2017-11-20 RX ADMIN — SODIUM CHLORIDE 200 MG: 0.9 INJECTION, SOLUTION INTRAVENOUS at 12:11

## 2017-11-20 NOTE — NURSING
Pt tolerated Keytruda well. No adverse reaction noted. Pt education reinforced on chemo regimen, side effects, what to expect, and when to call Dr. Sandra. Pt verbalized understanding. I reviewed pt calendar w/ pt and understanding verbalized. PAC deaccessed and flushed w/ NS and heparin per protocol.

## 2017-11-20 NOTE — PROGRESS NOTES
Subjective:       Patient ID: Andree Quiroz is a 67 y.o. female.    Chief Complaint: No chief complaint on file.    Lung Cancer   Associated symptoms include fatigue. Pertinent negatives include no abdominal pain, chest pain, coughing, fever, headaches, nausea, rash or weakness.      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  She had some nausea and dizziness at [employer], went to the ER, chest x-ray was ordered that showed a left suprahilar lesion.  CT of the chest with contrast 9/20/15 Showed 6.5 by 4.4 centimeter left upper lobe mass abutting the mediastinum.  CT-guided biopsy showed squamous cell carcinoma. PET scan did not show any extrathoracic disease. EBUS revealed - Station 4R (contralateral mediastinal lymph node) was negative. Station 7 (subcarinal) was positive for squamous cell carcinoma. Was felt not to be a surgical candidate.    Nov-Dec 2015 - Received concurrent ChemoRT with weekly carbo/taxol.  PET scan July 2016 showed progressive disease.    August 2016 - 2nd line chemotherapy with carboplatin and gemcitabine was started.    Here for follow-up  Denies any chest pain, difficulty breathing or hemoptysis.  No new pains.  No cough.  Continues to stay active.    CT chest 1/11/17 - Continued progression of disease with interval increase in size of bilateral pulmonary nodules.  PDL 1 testing shows 10% (low expression) TPS - tumor proportion score - scheduled to start KEYTRUDA.    CT chest 3/31 - Metastatic disease to the lung appears to have slightly progressed compared to the prior exam.    Started KEYTRUDA 4/3.    Got 1 dose Injectafer 6/5/17.    She f/u with  for CAD. No active issues. Doing well.    Recently completed a course of Levaquin and steroids for a respiratory infection and she feels better.    Review of Systems   Constitutional: Positive for fatigue. Negative for activity change, fever and unexpected weight change.   HENT:  Negative for facial swelling and nosebleeds.    Eyes: Negative for photophobia and pain.   Respiratory: Negative for cough and shortness of breath.    Cardiovascular: Negative for chest pain and leg swelling.   Gastrointestinal: Negative for abdominal pain, blood in stool, constipation and nausea.   Genitourinary: Negative for dysuria and hematuria.   Skin: Negative for color change and rash.   Neurological: Negative for seizures, weakness and headaches.   Hematological: Negative for adenopathy. Does not bruise/bleed easily.         Objective:      Physical Exam   Constitutional: She is oriented to person, place, and time. She appears well-developed and well-nourished. No distress.   HENT:   Mouth/Throat: Oropharynx is clear and moist and mucous membranes are normal. Mucous membranes are not pale. No oropharyngeal exudate.   Eyes: Conjunctivae are normal. No scleral icterus.   Neck: Normal range of motion. Neck supple. No thyroid mass (Thyroid area is non-tender) and no thyromegaly present.   Cardiovascular: Normal rate and regular rhythm.  Exam reveals no friction rub.    Pulmonary/Chest: Effort normal and breath sounds normal. No accessory muscle usage or stridor. No respiratory distress. She has no wheezes.   Abdominal: She exhibits no ascites and no mass. There is no hepatosplenomegaly. There is no tenderness.   Musculoskeletal: She exhibits no edema.   No varicosities noted.   Lymphadenopathy:     She has no cervical adenopathy.        Right: No supraclavicular adenopathy present.        Left: No supraclavicular adenopathy present.   Neurological: She is alert and oriented to person, place, and time.   Psychiatric: She has a normal mood and affect. Judgment and thought content normal. Cognition and memory are normal. She exhibits normal recent memory and normal remote memory.       Assessment:     1. 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 Encounter for antineoplastic immunotherapy    3. Coronary  artery disease involving native coronary artery of native heart without angina pectoris    4. Hypokalemia       Plan:   Restaging CT scans (10/27/17) - reviewed independently and with the patient in detail.  There is non-clinical insignificant increase in size of existing pulmonary nodules and questionable new nodules which are not clinically significant.  There are no new areas of disease activity in the bones or liver.    Based on this I recommended continuing KEYTRUDA.    Labs reviewed. Proceed with cycle 12 of KEYTRUDA as scheduled for today.    Continue Remeron for insomnia.      CAD stable. F/u with .    RTC in 3 wks for next dose of Keytruda.    Plan restaging scans in February/March 2017.    Prescribed potassium chloride for hypokalemia.

## 2017-11-20 NOTE — NURSING
Pt tolerated Keytrudawell. No adverse reaction noted. Pt education reinforced on chemo regimen, side effects, what to expect, and when to call Dr. Sandra__. Pt verbalized understanding. I reviewed pt calendar w/ pt and understanding verbalized. PAC deaccessed and flushed w/ NS and heparin per protocol.

## 2017-12-11 ENCOUNTER — INFUSION (OUTPATIENT)
Dept: INFUSION THERAPY | Facility: HOSPITAL | Age: 67
End: 2017-12-11
Attending: INTERNAL MEDICINE
Payer: MEDICARE

## 2017-12-11 ENCOUNTER — OFFICE VISIT (OUTPATIENT)
Dept: HEMATOLOGY/ONCOLOGY | Facility: CLINIC | Age: 67
End: 2017-12-11
Payer: MEDICARE

## 2017-12-11 VITALS
BODY MASS INDEX: 15.17 KG/M2 | HEIGHT: 62 IN | SYSTOLIC BLOOD PRESSURE: 124 MMHG | OXYGEN SATURATION: 95 % | WEIGHT: 82.44 LBS | TEMPERATURE: 98 F | HEART RATE: 88 BPM | DIASTOLIC BLOOD PRESSURE: 78 MMHG

## 2017-12-11 DIAGNOSIS — Z51.11 ENCOUNTER FOR ANTINEOPLASTIC CHEMOTHERAPY: ICD-10-CM

## 2017-12-11 DIAGNOSIS — C78.02 MALIGNANT NEOPLASM METASTATIC TO LEFT LUNG: Primary | ICD-10-CM

## 2017-12-11 DIAGNOSIS — C78.02 MALIGNANT NEOPLASM METASTATIC TO LEFT LUNG: ICD-10-CM

## 2017-12-11 DIAGNOSIS — C34.92 STAGE IV SQUAMOUS CELL CARCINOMA OF LEFT LUNG: Primary | ICD-10-CM

## 2017-12-11 DIAGNOSIS — E87.6 HYPOKALEMIA: ICD-10-CM

## 2017-12-11 DIAGNOSIS — I25.10 CORONARY ARTERY DISEASE INVOLVING NATIVE CORONARY ARTERY OF NATIVE HEART WITHOUT ANGINA PECTORIS: ICD-10-CM

## 2017-12-11 PROCEDURE — 96375 TX/PRO/DX INJ NEW DRUG ADDON: CPT

## 2017-12-11 PROCEDURE — 99499 UNLISTED E&M SERVICE: CPT | Mod: S$PBB,,, | Performed by: INTERNAL MEDICINE

## 2017-12-11 PROCEDURE — 25000003 PHARM REV CODE 250: Performed by: INTERNAL MEDICINE

## 2017-12-11 PROCEDURE — 96413 CHEMO IV INFUSION 1 HR: CPT

## 2017-12-11 PROCEDURE — 63600175 PHARM REV CODE 636 W HCPCS: Performed by: INTERNAL MEDICINE

## 2017-12-11 PROCEDURE — 99999 PR PBB SHADOW E&M-EST. PATIENT-LVL III: CPT | Mod: PBBFAC,,, | Performed by: INTERNAL MEDICINE

## 2017-12-11 PROCEDURE — 99214 OFFICE O/P EST MOD 30 MIN: CPT | Mod: S$GLB,,, | Performed by: INTERNAL MEDICINE

## 2017-12-11 RX ORDER — DIPHENHYDRAMINE HYDROCHLORIDE 50 MG/ML
12.5 INJECTION INTRAMUSCULAR; INTRAVENOUS ONCE
Status: COMPLETED | OUTPATIENT
Start: 2017-12-11 | End: 2017-12-11

## 2017-12-11 RX ORDER — AZITHROMYCIN 250 MG/1
TABLET, FILM COATED ORAL
Qty: 6 TABLET | Refills: 0 | Status: SHIPPED | OUTPATIENT
Start: 2017-12-11 | End: 2018-05-03

## 2017-12-11 RX ORDER — SODIUM CHLORIDE 0.9 % (FLUSH) 0.9 %
10 SYRINGE (ML) INJECTION
Status: DISCONTINUED | OUTPATIENT
Start: 2017-12-11 | End: 2017-12-11 | Stop reason: HOSPADM

## 2017-12-11 RX ORDER — SODIUM CHLORIDE 0.9 % (FLUSH) 0.9 %
10 SYRINGE (ML) INJECTION
Status: CANCELLED | OUTPATIENT
Start: 2017-12-11

## 2017-12-11 RX ORDER — HEPARIN 100 UNIT/ML
500 SYRINGE INTRAVENOUS
Status: DISCONTINUED | OUTPATIENT
Start: 2017-12-11 | End: 2017-12-11 | Stop reason: HOSPADM

## 2017-12-11 RX ORDER — POTASSIUM CHLORIDE 20 MEQ/1
20 TABLET, EXTENDED RELEASE ORAL DAILY
Qty: 30 TABLET | Refills: 2 | Status: SHIPPED | OUTPATIENT
Start: 2017-12-11 | End: 2018-02-12

## 2017-12-11 RX ORDER — HEPARIN 100 UNIT/ML
500 SYRINGE INTRAVENOUS
Status: CANCELLED | OUTPATIENT
Start: 2017-12-11

## 2017-12-11 RX ADMIN — HEPARIN SODIUM (PORCINE) LOCK FLUSH IV SOLN 100 UNIT/ML 500 UNITS: 100 SOLUTION at 12:12

## 2017-12-11 RX ADMIN — DIPHENHYDRAMINE HYDROCHLORIDE 12.5 MG: 50 INJECTION, SOLUTION INTRAMUSCULAR; INTRAVENOUS at 12:12

## 2017-12-11 RX ADMIN — SODIUM CHLORIDE: 900 INJECTION, SOLUTION INTRAVENOUS at 11:12

## 2017-12-11 RX ADMIN — SODIUM CHLORIDE 200 MG: 900 INJECTION, SOLUTION INTRAVENOUS at 11:12

## 2017-12-11 NOTE — PROGRESS NOTES
Subjective:       Patient ID: Andree Quiroz is a 67 y.o. female.    Chief Complaint: No chief complaint on file.    Lung Cancer   Associated symptoms include fatigue. Pertinent negatives include no abdominal pain, chest pain, coughing, fever, headaches, nausea, rash or weakness.      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  She had some nausea and dizziness at [employer], went to the ER, chest x-ray was ordered that showed a left suprahilar lesion.  CT of the chest with contrast 9/20/15 Showed 6.5 by 4.4 centimeter left upper lobe mass abutting the mediastinum.  CT-guided biopsy showed squamous cell carcinoma. PET scan did not show any extrathoracic disease. EBUS revealed - Station 4R (contralateral mediastinal lymph node) was negative. Station 7 (subcarinal) was positive for squamous cell carcinoma. Was felt not to be a surgical candidate.    Nov-Dec 2015 - Received concurrent ChemoRT with weekly carbo/taxol.  PET scan July 2016 showed progressive disease.    August 2016 - 2nd line chemotherapy with carboplatin and gemcitabine was started.    Here for follow-up  Denies any chest pain, difficulty breathing or hemoptysis.  No new pains.  No cough.  Continues to stay active.    CT chest 1/11/17 - Continued progression of disease with interval increase in size of bilateral pulmonary nodules.  PDL 1 testing shows 10% (low expression) TPS - tumor proportion score - scheduled to start KEYTRUDA.    CT chest 3/31 - Metastatic disease to the lung appears to have slightly progressed compared to the prior exam.    Started KEYTRUDA 4/3.    Got 1 dose Injectafer 6/5/17.    She f/u with  for CAD. No active issues. Doing well.    Recently completed a course of Levaquin and steroids for a respiratory infection and she feels better.    Review of Systems   Constitutional: Positive for fatigue. Negative for activity change, fever and unexpected weight change.   HENT:  Negative for facial swelling and nosebleeds.    Eyes: Negative for photophobia and pain.   Respiratory: Negative for cough and shortness of breath.    Cardiovascular: Negative for chest pain and leg swelling.   Gastrointestinal: Negative for abdominal pain, blood in stool, constipation and nausea.   Genitourinary: Negative for dysuria and hematuria.   Skin: Negative for color change and rash.   Neurological: Negative for seizures, weakness and headaches.   Hematological: Negative for adenopathy. Does not bruise/bleed easily.         Objective:      Physical Exam   Constitutional: She is oriented to person, place, and time. She appears well-developed and well-nourished. No distress.   HENT:   Mouth/Throat: Oropharynx is clear and moist and mucous membranes are normal. Mucous membranes are not pale. No oropharyngeal exudate.   Eyes: Conjunctivae are normal. No scleral icterus.   Neck: Normal range of motion. Neck supple. No thyroid mass (Thyroid area is non-tender) and no thyromegaly present.   Cardiovascular: Normal rate and regular rhythm.  Exam reveals no friction rub.    Pulmonary/Chest: Effort normal and breath sounds normal. No accessory muscle usage or stridor. No respiratory distress. She has no wheezes.   Abdominal: She exhibits no ascites and no mass. There is no hepatosplenomegaly. There is no tenderness.   Musculoskeletal: She exhibits no edema.   No varicosities noted.   Lymphadenopathy:     She has no cervical adenopathy.        Right: No supraclavicular adenopathy present.        Left: No supraclavicular adenopathy present.   Neurological: She is alert and oriented to person, place, and time.   Psychiatric: She has a normal mood and affect. Judgment and thought content normal. Cognition and memory are normal. She exhibits normal recent memory and normal remote memory.       Assessment:     1. 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 Encounter  for antineoplastic chemotherapy    4. Coronary artery disease involving native coronary artery of native heart without angina pectoris       Plan:   Restaging CT scans (10/27/17) - reviewed independently and with the patient in detail.  There is non-clinical insignificant increase in size of existing pulmonary nodules and questionable new nodules which are not clinically significant.  There are no new areas of disease activity in the bones or liver.    Based on this I recommended continuing KEYTRUDA.    Labs reviewed. Proceed with cycle 13 of KEYTRUDA as scheduled for today.    Continue Remeron for insomnia.      CAD stable. F/u with .    RTC in 3 wks for next dose of Keytruda.    Plan restaging scans in February/March 2017.    Prescribed potassium chloride for hypokalemia.

## 2018-01-02 ENCOUNTER — INFUSION (OUTPATIENT)
Dept: INFUSION THERAPY | Facility: HOSPITAL | Age: 68
End: 2018-01-02
Attending: INTERNAL MEDICINE
Payer: MEDICARE

## 2018-01-02 ENCOUNTER — OFFICE VISIT (OUTPATIENT)
Dept: HEMATOLOGY/ONCOLOGY | Facility: CLINIC | Age: 68
End: 2018-01-02
Payer: MEDICARE

## 2018-01-02 VITALS
WEIGHT: 83.75 LBS | HEART RATE: 77 BPM | OXYGEN SATURATION: 97 % | SYSTOLIC BLOOD PRESSURE: 136 MMHG | DIASTOLIC BLOOD PRESSURE: 81 MMHG | TEMPERATURE: 98 F | BODY MASS INDEX: 15.41 KG/M2 | HEIGHT: 62 IN

## 2018-01-02 DIAGNOSIS — C34.92 STAGE IV SQUAMOUS CELL CARCINOMA OF LEFT LUNG: Primary | ICD-10-CM

## 2018-01-02 DIAGNOSIS — Z51.11 ENCOUNTER FOR ANTINEOPLASTIC CHEMOTHERAPY: ICD-10-CM

## 2018-01-02 DIAGNOSIS — C78.02 MALIGNANT NEOPLASM METASTATIC TO LEFT LUNG: ICD-10-CM

## 2018-01-02 DIAGNOSIS — C78.02 MALIGNANT NEOPLASM METASTATIC TO LEFT LUNG: Primary | ICD-10-CM

## 2018-01-02 PROCEDURE — 25000003 PHARM REV CODE 250: Performed by: INTERNAL MEDICINE

## 2018-01-02 PROCEDURE — 63600175 PHARM REV CODE 636 W HCPCS: Mod: JG | Performed by: INTERNAL MEDICINE

## 2018-01-02 PROCEDURE — 99999 PR PBB SHADOW E&M-EST. PATIENT-LVL III: CPT | Mod: PBBFAC,,, | Performed by: INTERNAL MEDICINE

## 2018-01-02 PROCEDURE — 96413 CHEMO IV INFUSION 1 HR: CPT

## 2018-01-02 PROCEDURE — 96375 TX/PRO/DX INJ NEW DRUG ADDON: CPT

## 2018-01-02 PROCEDURE — 99214 OFFICE O/P EST MOD 30 MIN: CPT | Mod: S$GLB,,, | Performed by: INTERNAL MEDICINE

## 2018-01-02 RX ORDER — SODIUM CHLORIDE 0.9 % (FLUSH) 0.9 %
10 SYRINGE (ML) INJECTION
Status: DISCONTINUED | OUTPATIENT
Start: 2018-01-02 | End: 2018-01-02 | Stop reason: HOSPADM

## 2018-01-02 RX ORDER — SODIUM CHLORIDE 0.9 % (FLUSH) 0.9 %
10 SYRINGE (ML) INJECTION
Status: CANCELLED | OUTPATIENT
Start: 2018-01-02

## 2018-01-02 RX ORDER — DIPHENHYDRAMINE HYDROCHLORIDE 50 MG/ML
12.5 INJECTION INTRAMUSCULAR; INTRAVENOUS ONCE
Status: DISCONTINUED | OUTPATIENT
Start: 2018-01-02 | End: 2018-01-02 | Stop reason: HOSPADM

## 2018-01-02 RX ORDER — HEPARIN 100 UNIT/ML
500 SYRINGE INTRAVENOUS
Status: DISCONTINUED | OUTPATIENT
Start: 2018-01-02 | End: 2018-01-02 | Stop reason: HOSPADM

## 2018-01-02 RX ORDER — HEPARIN 100 UNIT/ML
500 SYRINGE INTRAVENOUS
Status: CANCELLED | OUTPATIENT
Start: 2018-01-02

## 2018-01-02 RX ADMIN — SODIUM CHLORIDE 200 MG: 900 INJECTION, SOLUTION INTRAVENOUS at 11:01

## 2018-01-02 RX ADMIN — HEPARIN 500 UNITS: 100 SYRINGE at 12:01

## 2018-01-02 RX ADMIN — SODIUM CHLORIDE: 900 INJECTION, SOLUTION INTRAVENOUS at 11:01

## 2018-01-02 NOTE — PROGRESS NOTES
Subjective:       Patient ID: Andree Quiroz is a 67 y.o. female.    Chief Complaint: Lung Cancer    Lung Cancer   Associated symptoms include fatigue. Pertinent negatives include no abdominal pain, chest pain, coughing, fever, headaches, nausea, rash or weakness.      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  She had some nausea and dizziness at [employer], went to the ER, chest x-ray was ordered that showed a left suprahilar lesion.  CT of the chest with contrast 9/20/15 Showed 6.5 by 4.4 centimeter left upper lobe mass abutting the mediastinum.  CT-guided biopsy showed squamous cell carcinoma. PET scan did not show any extrathoracic disease. EBUS revealed - Station 4R (contralateral mediastinal lymph node) was negative. Station 7 (subcarinal) was positive for squamous cell carcinoma. Was felt not to be a surgical candidate.    Nov-Dec 2015 - Received concurrent ChemoRT with weekly carbo/taxol.  PET scan July 2016 showed progressive disease.    August 2016 - 2nd line chemotherapy with carboplatin and gemcitabine was started.    Here for follow-up  Denies any chest pain, difficulty breathing or hemoptysis.  No new pains.  No cough.  Continues to stay active.    CT chest 1/11/17 - Continued progression of disease with interval increase in size of bilateral pulmonary nodules.  PDL 1 testing shows 10% (low expression) TPS - tumor proportion score - scheduled to start KEYTRUDA.    CT chest 3/31 - Metastatic disease to the lung appears to have slightly progressed compared to the prior exam.    Started KEYTRUDA 4/3/17.    Got 1 dose Injectafer 6/5/17.    She f/u with  for CAD. No active issues. Doing well.    Review of Systems   Constitutional: Positive for fatigue. Negative for activity change, fever and unexpected weight change.   HENT: Negative for facial swelling and nosebleeds.    Eyes: Negative for photophobia and pain.   Respiratory: Negative for cough  and shortness of breath.    Cardiovascular: Negative for chest pain and leg swelling.   Gastrointestinal: Negative for abdominal pain, blood in stool, constipation and nausea.   Genitourinary: Negative for dysuria and hematuria.   Skin: Negative for color change and rash.   Neurological: Negative for seizures, weakness and headaches.   Hematological: Negative for adenopathy. Does not bruise/bleed easily.         Objective:      Physical Exam   Constitutional: She is oriented to person, place, and time. She appears well-developed and well-nourished. No distress.   HENT:   Mouth/Throat: Oropharynx is clear and moist and mucous membranes are normal. Mucous membranes are not pale. No oropharyngeal exudate.   Eyes: Conjunctivae are normal. No scleral icterus.   Neck: Normal range of motion. Neck supple. No thyroid mass (Thyroid area is non-tender) and no thyromegaly present.   Cardiovascular: Normal rate and regular rhythm.  Exam reveals no friction rub.    Pulmonary/Chest: Effort normal and breath sounds normal. No accessory muscle usage or stridor. No respiratory distress. She has no wheezes.   Abdominal: She exhibits no ascites and no mass. There is no hepatosplenomegaly. There is no tenderness.   Musculoskeletal: She exhibits no edema.   No varicosities noted.   Lymphadenopathy:     She has no cervical adenopathy.        Right: No supraclavicular adenopathy present.        Left: No supraclavicular adenopathy present.   Neurological: She is alert and oriented to person, place, and time.   Psychiatric: She has a normal mood and affect. Judgment and thought content normal. Cognition and memory are normal. She exhibits normal recent memory and normal remote memory.       Assessment:     1. 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 Encounter for antineoplastic chemotherapy    3. Malignant neoplasm metastatic to left lung       Plan:   Restaging CT scans (10/27/17) - reviewed independently and with the patient in  detail.  There is non-clinical insignificant increase in size of existing pulmonary nodules and questionable new nodules which are not clinically significant.  There are no new areas of disease activity in the bones or liver.    Based on this I recommended continuing KEYTRUDA.    Labs reviewed. Proceed with cycle 14 of KEYTRUDA as scheduled for today.    Continue Remeron for insomnia.      CAD stable. F/u with .    RTC in 3 wks for next dose of Keytruda.    Plan restaging scans in February/March 2017.    Continue potassium chloride for hypokalemia.

## 2018-01-22 ENCOUNTER — OFFICE VISIT (OUTPATIENT)
Dept: HEMATOLOGY/ONCOLOGY | Facility: CLINIC | Age: 68
End: 2018-01-22
Payer: MEDICARE

## 2018-01-22 ENCOUNTER — INFUSION (OUTPATIENT)
Dept: INFUSION THERAPY | Facility: HOSPITAL | Age: 68
End: 2018-01-22
Attending: INTERNAL MEDICINE
Payer: MEDICARE

## 2018-01-22 VITALS
WEIGHT: 82.88 LBS | TEMPERATURE: 99 F | OXYGEN SATURATION: 95 % | BODY MASS INDEX: 15.25 KG/M2 | HEART RATE: 95 BPM | DIASTOLIC BLOOD PRESSURE: 76 MMHG | HEIGHT: 62 IN | SYSTOLIC BLOOD PRESSURE: 125 MMHG

## 2018-01-22 DIAGNOSIS — Z51.11 ENCOUNTER FOR ANTINEOPLASTIC CHEMOTHERAPY: ICD-10-CM

## 2018-01-22 DIAGNOSIS — I25.10 CORONARY ARTERY DISEASE INVOLVING NATIVE CORONARY ARTERY OF NATIVE HEART WITHOUT ANGINA PECTORIS: ICD-10-CM

## 2018-01-22 DIAGNOSIS — C78.02 MALIGNANT NEOPLASM METASTATIC TO LEFT LUNG: ICD-10-CM

## 2018-01-22 DIAGNOSIS — E87.6 HYPOKALEMIA: ICD-10-CM

## 2018-01-22 DIAGNOSIS — C34.92 STAGE IV SQUAMOUS CELL CARCINOMA OF LEFT LUNG: Primary | ICD-10-CM

## 2018-01-22 DIAGNOSIS — C78.02 MALIGNANT NEOPLASM METASTATIC TO LEFT LUNG: Primary | ICD-10-CM

## 2018-01-22 PROCEDURE — 96413 CHEMO IV INFUSION 1 HR: CPT

## 2018-01-22 PROCEDURE — 99999 PR PBB SHADOW E&M-EST. PATIENT-LVL III: CPT | Mod: PBBFAC,,, | Performed by: INTERNAL MEDICINE

## 2018-01-22 PROCEDURE — 99214 OFFICE O/P EST MOD 30 MIN: CPT | Mod: S$GLB,,, | Performed by: INTERNAL MEDICINE

## 2018-01-22 PROCEDURE — 63600175 PHARM REV CODE 636 W HCPCS: Mod: JG | Performed by: INTERNAL MEDICINE

## 2018-01-22 PROCEDURE — 96375 TX/PRO/DX INJ NEW DRUG ADDON: CPT

## 2018-01-22 PROCEDURE — 25000003 PHARM REV CODE 250: Performed by: INTERNAL MEDICINE

## 2018-01-22 RX ORDER — DIPHENHYDRAMINE HYDROCHLORIDE 50 MG/ML
12.5 INJECTION INTRAMUSCULAR; INTRAVENOUS ONCE
Status: COMPLETED | OUTPATIENT
Start: 2018-01-22 | End: 2018-01-22

## 2018-01-22 RX ORDER — HEPARIN 100 UNIT/ML
500 SYRINGE INTRAVENOUS
Status: CANCELLED | OUTPATIENT
Start: 2018-01-22

## 2018-01-22 RX ORDER — HEPARIN 100 UNIT/ML
500 SYRINGE INTRAVENOUS
Status: DISCONTINUED | OUTPATIENT
Start: 2018-01-22 | End: 2018-01-22 | Stop reason: HOSPADM

## 2018-01-22 RX ORDER — SODIUM CHLORIDE 0.9 % (FLUSH) 0.9 %
10 SYRINGE (ML) INJECTION
Status: CANCELLED | OUTPATIENT
Start: 2018-01-22

## 2018-01-22 RX ORDER — SODIUM CHLORIDE 0.9 % (FLUSH) 0.9 %
10 SYRINGE (ML) INJECTION
Status: DISCONTINUED | OUTPATIENT
Start: 2018-01-22 | End: 2018-01-22 | Stop reason: HOSPADM

## 2018-01-22 RX ADMIN — SODIUM CHLORIDE 200 MG: 0.9 INJECTION, SOLUTION INTRAVENOUS at 11:01

## 2018-01-22 RX ADMIN — DIPHENHYDRAMINE HYDROCHLORIDE 12.5 MG: 50 INJECTION, SOLUTION INTRAMUSCULAR; INTRAVENOUS at 11:01

## 2018-01-22 RX ADMIN — SODIUM CHLORIDE: 900 INJECTION, SOLUTION INTRAVENOUS at 11:01

## 2018-01-22 RX ADMIN — HEPARIN 500 UNITS: 100 SYRINGE at 12:01

## 2018-01-22 NOTE — PROGRESS NOTES
Subjective:       Patient ID: Andree Quiroz is a 67 y.o. female.    Chief Complaint: Lung Cancer    Lung Cancer   Associated symptoms include fatigue. Pertinent negatives include no abdominal pain, chest pain, coughing, fever, headaches, nausea, rash or weakness.      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  She had some nausea and dizziness at [employer], went to the ER, chest x-ray was ordered that showed a left suprahilar lesion.  CT of the chest with contrast 9/20/15 Showed 6.5 by 4.4 centimeter left upper lobe mass abutting the mediastinum.  CT-guided biopsy showed squamous cell carcinoma. PET scan did not show any extrathoracic disease. EBUS revealed - Station 4R (contralateral mediastinal lymph node) was negative. Station 7 (subcarinal) was positive for squamous cell carcinoma. Was felt not to be a surgical candidate.    Nov-Dec 2015 - Received concurrent ChemoRT with weekly carbo/taxol.  PET scan July 2016 showed progressive disease.    August 2016 - 2nd line chemotherapy with carboplatin and gemcitabine was started.    Here for follow-up  Denies any chest pain, difficulty breathing or hemoptysis.  No new pains.  No cough.  Continues to stay active.    CT chest 1/11/17 - Continued progression of disease with interval increase in size of bilateral pulmonary nodules.  PDL 1 testing shows 10% (low expression) TPS - tumor proportion score - scheduled to start KEYTRUDA.    CT chest 3/31 - Metastatic disease to the lung appears to have slightly progressed compared to the prior exam.    Started KEYTRUDA 4/3/17.    Got 1 dose Injectafer 6/5/17.    She f/u with  for CAD. No active issues. Doing well.    Review of Systems   Constitutional: Positive for fatigue. Negative for activity change, fever and unexpected weight change.   HENT: Negative for facial swelling and nosebleeds.    Eyes: Negative for photophobia and pain.   Respiratory: Negative for cough  and shortness of breath.    Cardiovascular: Negative for chest pain and leg swelling.   Gastrointestinal: Negative for abdominal pain, blood in stool, constipation and nausea.   Genitourinary: Negative for dysuria and hematuria.   Skin: Negative for color change and rash.   Neurological: Negative for seizures, weakness and headaches.   Hematological: Negative for adenopathy. Does not bruise/bleed easily.         Objective:      Physical Exam   Constitutional: She is oriented to person, place, and time. She appears well-developed and well-nourished. No distress.   HENT:   Mouth/Throat: Oropharynx is clear and moist and mucous membranes are normal. Mucous membranes are not pale. No oropharyngeal exudate.   Eyes: Conjunctivae are normal. No scleral icterus.   Neck: Normal range of motion. Neck supple. No thyroid mass (Thyroid area is non-tender) and no thyromegaly present.   Cardiovascular: Normal rate and regular rhythm.  Exam reveals no friction rub.    Pulmonary/Chest: Effort normal and breath sounds normal. No accessory muscle usage or stridor. No respiratory distress. She has no wheezes.   Abdominal: She exhibits no ascites and no mass. There is no hepatosplenomegaly. There is no tenderness.   Musculoskeletal: She exhibits no edema.   No varicosities noted.   Lymphadenopathy:     She has no cervical adenopathy.        Right: No supraclavicular adenopathy present.        Left: No supraclavicular adenopathy present.   Neurological: She is alert and oriented to person, place, and time.   Psychiatric: She has a normal mood and affect. Judgment and thought content normal. Cognition and memory are normal. She exhibits normal recent memory and normal remote memory.       Assessment:     1. 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 Encounter for antineoplastic chemotherapy    3. Coronary artery disease involving native coronary artery of native heart without angina pectoris    4. Hypokalemia    5. Malignant  neoplasm metastatic to left lung       Plan:   Restaging CT scans (10/27/17) - reviewed independently and with the patient in detail.  There is non-clinical insignificant increase in size of existing pulmonary nodules and questionable new nodules which are not clinically significant.  There are no new areas of disease activity in the bones or liver.    Based on this I recommended continuing KEYTRUDA.    Labs reviewed. Proceed with cycle 15 of KEYTRUDA as scheduled for today.    Continue Remeron for insomnia.      CAD stable. F/u with .    RTC in 3 wks for next dose of Keytruda.    Plan restaging scans in February/March 2017.    Continue potassium chloride for hypokalemia.

## 2018-02-12 ENCOUNTER — OFFICE VISIT (OUTPATIENT)
Dept: HEMATOLOGY/ONCOLOGY | Facility: CLINIC | Age: 68
End: 2018-02-12
Payer: MEDICARE

## 2018-02-12 ENCOUNTER — INFUSION (OUTPATIENT)
Dept: INFUSION THERAPY | Facility: HOSPITAL | Age: 68
End: 2018-02-12
Attending: INTERNAL MEDICINE
Payer: MEDICARE

## 2018-02-12 ENCOUNTER — TELEPHONE (OUTPATIENT)
Dept: HEMATOLOGY/ONCOLOGY | Facility: CLINIC | Age: 68
End: 2018-02-12

## 2018-02-12 VITALS
HEART RATE: 85 BPM | WEIGHT: 82.88 LBS | BODY MASS INDEX: 15.25 KG/M2 | HEIGHT: 62 IN | TEMPERATURE: 98 F | DIASTOLIC BLOOD PRESSURE: 68 MMHG | SYSTOLIC BLOOD PRESSURE: 110 MMHG | OXYGEN SATURATION: 95 %

## 2018-02-12 DIAGNOSIS — C78.02 MALIGNANT NEOPLASM METASTATIC TO LEFT LUNG: ICD-10-CM

## 2018-02-12 DIAGNOSIS — C34.92 STAGE IV SQUAMOUS CELL CARCINOMA OF LEFT LUNG: Primary | ICD-10-CM

## 2018-02-12 DIAGNOSIS — C78.02 MALIGNANT NEOPLASM METASTATIC TO LEFT LUNG: Primary | ICD-10-CM

## 2018-02-12 DIAGNOSIS — Z51.11 ENCOUNTER FOR ANTINEOPLASTIC CHEMOTHERAPY: ICD-10-CM

## 2018-02-12 DIAGNOSIS — I25.10 CORONARY ARTERY DISEASE INVOLVING NATIVE CORONARY ARTERY OF NATIVE HEART WITHOUT ANGINA PECTORIS: ICD-10-CM

## 2018-02-12 DIAGNOSIS — K29.70 GASTRITIS, PRESENCE OF BLEEDING UNSPECIFIED, UNSPECIFIED CHRONICITY, UNSPECIFIED GASTRITIS TYPE: ICD-10-CM

## 2018-02-12 PROCEDURE — 25000003 PHARM REV CODE 250: Performed by: INTERNAL MEDICINE

## 2018-02-12 PROCEDURE — 99214 OFFICE O/P EST MOD 30 MIN: CPT | Mod: S$GLB,,, | Performed by: INTERNAL MEDICINE

## 2018-02-12 PROCEDURE — 96413 CHEMO IV INFUSION 1 HR: CPT

## 2018-02-12 PROCEDURE — 1159F MED LIST DOCD IN RCRD: CPT | Mod: S$GLB,,, | Performed by: INTERNAL MEDICINE

## 2018-02-12 PROCEDURE — 1126F AMNT PAIN NOTED NONE PRSNT: CPT | Mod: S$GLB,,, | Performed by: INTERNAL MEDICINE

## 2018-02-12 PROCEDURE — 99999 PR PBB SHADOW E&M-EST. PATIENT-LVL III: CPT | Mod: PBBFAC,,, | Performed by: INTERNAL MEDICINE

## 2018-02-12 PROCEDURE — 96375 TX/PRO/DX INJ NEW DRUG ADDON: CPT

## 2018-02-12 PROCEDURE — 3008F BODY MASS INDEX DOCD: CPT | Mod: S$GLB,,, | Performed by: INTERNAL MEDICINE

## 2018-02-12 PROCEDURE — 63600175 PHARM REV CODE 636 W HCPCS: Performed by: INTERNAL MEDICINE

## 2018-02-12 RX ORDER — OMEPRAZOLE 20 MG/1
20 CAPSULE, DELAYED RELEASE ORAL DAILY
Qty: 90 CAPSULE | Refills: 1 | Status: SHIPPED | OUTPATIENT
Start: 2018-02-12 | End: 2018-05-22

## 2018-02-12 RX ORDER — HEPARIN 100 UNIT/ML
500 SYRINGE INTRAVENOUS
Status: CANCELLED | OUTPATIENT
Start: 2018-02-12

## 2018-02-12 RX ORDER — HEPARIN 100 UNIT/ML
500 SYRINGE INTRAVENOUS
Status: DISCONTINUED | OUTPATIENT
Start: 2018-02-12 | End: 2018-02-12 | Stop reason: HOSPADM

## 2018-02-12 RX ORDER — SODIUM CHLORIDE 0.9 % (FLUSH) 0.9 %
10 SYRINGE (ML) INJECTION
Status: DISCONTINUED | OUTPATIENT
Start: 2018-02-12 | End: 2018-02-12 | Stop reason: HOSPADM

## 2018-02-12 RX ORDER — SODIUM CHLORIDE 0.9 % (FLUSH) 0.9 %
10 SYRINGE (ML) INJECTION
Status: CANCELLED | OUTPATIENT
Start: 2018-02-12

## 2018-02-12 RX ORDER — DIPHENHYDRAMINE HYDROCHLORIDE 50 MG/ML
12.5 INJECTION INTRAMUSCULAR; INTRAVENOUS
Status: COMPLETED | OUTPATIENT
Start: 2018-02-12 | End: 2018-02-12

## 2018-02-12 RX ADMIN — SODIUM CHLORIDE: 0.9 INJECTION, SOLUTION INTRAVENOUS at 11:02

## 2018-02-12 RX ADMIN — SODIUM CHLORIDE 200 MG: 9 INJECTION, SOLUTION INTRAVENOUS at 11:02

## 2018-02-12 RX ADMIN — DIPHENHYDRAMINE HYDROCHLORIDE 12.5 MG: 50 INJECTION, SOLUTION INTRAMUSCULAR; INTRAVENOUS at 11:02

## 2018-02-12 RX ADMIN — HEPARIN 500 UNITS: 100 SYRINGE at 12:02

## 2018-02-12 NOTE — PLAN OF CARE
Problem: Patient Care Overview  Goal: Plan of Care Review  Outcome: Ongoing (interventions implemented as appropriate)  Pt states she feels well today, a little tired.

## 2018-02-12 NOTE — PROGRESS NOTES
Subjective:       Patient ID: Andree Quiroz is a 67 y.o. female.    Chief Complaint: Lung Cancer    Lung Cancer   Associated symptoms include fatigue. Pertinent negatives include no abdominal pain, chest pain, coughing, fever, headaches, nausea, rash or weakness.      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  She had some nausea and dizziness at [employer], went to the ER, chest x-ray was ordered that showed a left suprahilar lesion.  CT of the chest with contrast 9/20/15 Showed 6.5 by 4.4 centimeter left upper lobe mass abutting the mediastinum.  CT-guided biopsy showed squamous cell carcinoma. PET scan did not show any extrathoracic disease. EBUS revealed - Station 4R (contralateral mediastinal lymph node) was negative. Station 7 (subcarinal) was positive for squamous cell carcinoma. Was felt not to be a surgical candidate.    Nov-Dec 2015 - Received concurrent ChemoRT with weekly carbo/taxol.  PET scan July 2016 showed progressive disease.    August 2016 - 2nd line chemotherapy with carboplatin and gemcitabine was started.    Here for follow-up  Denies any chest pain, difficulty breathing or hemoptysis.  No new pains.  No cough.  Continues to stay active.    CT chest 1/11/17 - Continued progression of disease with interval increase in size of bilateral pulmonary nodules.  PDL 1 testing shows 10% (low expression) TPS - tumor proportion score - scheduled to start KEYTRUDA.    CT chest 3/31/17 - Metastatic disease to the lung appears to have slightly progressed compared to the prior exam.    Started KEYTRUDA 4/3/17.    Got 1 dose Injectafer 6/5/17.    She f/u with  for CAD. No active issues. Doing well.    Review of Systems   Constitutional: Positive for fatigue. Negative for activity change, fever and unexpected weight change.   HENT: Negative for facial swelling and nosebleeds.    Eyes: Negative for photophobia and pain.   Respiratory: Negative for  cough and shortness of breath.    Cardiovascular: Negative for chest pain and leg swelling.   Gastrointestinal: Negative for abdominal pain, blood in stool, constipation and nausea.   Genitourinary: Negative for dysuria and hematuria.   Skin: Negative for color change and rash.   Neurological: Negative for seizures, weakness and headaches.   Hematological: Negative for adenopathy. Does not bruise/bleed easily.         Objective:      Physical Exam   Constitutional: She is oriented to person, place, and time. She appears well-developed and well-nourished. No distress.   HENT:   Mouth/Throat: Oropharynx is clear and moist and mucous membranes are normal. Mucous membranes are not pale. No oropharyngeal exudate.   Eyes: Conjunctivae are normal. No scleral icterus.   Neck: Normal range of motion. Neck supple. No thyroid mass (Thyroid area is non-tender) and no thyromegaly present.   Cardiovascular: Normal rate and regular rhythm.  Exam reveals no friction rub.    Pulmonary/Chest: Effort normal and breath sounds normal. No accessory muscle usage or stridor. No respiratory distress. She has no wheezes.   Abdominal: She exhibits no ascites and no mass. There is no hepatosplenomegaly. There is no tenderness.   Musculoskeletal: She exhibits no edema.   No varicosities noted.   Lymphadenopathy:     She has no cervical adenopathy.        Right: No supraclavicular adenopathy present.        Left: No supraclavicular adenopathy present.   Neurological: She is alert and oriented to person, place, and time.   Psychiatric: She has a normal mood and affect. Judgment and thought content normal. Cognition and memory are normal. She exhibits normal recent memory and normal remote memory.       Assessment:     1. 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 Encounter for antineoplastic chemotherapy    4. Coronary artery disease involving native coronary artery of native heart without  angina pectoris       Plan:   Restaging CT scans (10/27/17) - reviewed independently and with the patient in detail.  There is non-clinical insignificant increase in size of existing pulmonary nodules and questionable new nodules which are not clinically significant.  There are no new areas of disease activity in the bones or liver. Based on this I recommended continuing KEYTRUDA.    Labs reviewed. Proceed with cycle 16 of KEYTRUDA as scheduled for today.    Continue Remeron for insomnia.      CAD stable. F/u with .    RTC in 3 wks for next dose of Keytruda.    Plan restaging scans before f/u.    Trial of prilosec for epigastric pain

## 2018-02-12 NOTE — PATIENT INSTRUCTIONS
IF YOU EXPERIENCE ANY OF THE FOLLOWING PROBLEMS, CALL THE OFFICE IMMEDIATELY.    *FEVER .0 OR GREATER    *CHILLS, ESPECIALLY SHAKING CHILLS, OR SWEATING    *A SEVERE COUGH OR SORE THROAT, OR SINUS PAIN/     PRESSURE    *REDNESS, SWELLING, OR TENDERNESS AROUND A WOUND,     SORE, PIMPLE, RECTAL AREA, OR IV SITE    *SORES OR ULCERS IN THE MOUTH    *BLISTERS ON THE LIPS OR SKIN    *FREQUENT URGENCY TO URINATE OR A BURNING FEELING   WHEN YOU URINATE    *BLOOD IN THE URINE OR STOOL    *ANY UNEXPLAINED BRUISING OR PROLONGED BLEEDING,     (NOSEBLEEDS OR BLEEDING GUMS)    *LOOSE BOWEL MOVEMENTS THAT DO NOT RESPOND TO     IMODIUM OR MORE THAN THREE TIMES A DAY    *VOMITING UNRESPONSIVE TO ANTINAUSEA MEDICINE    *ANY UNUSUAL PHYSICAL SYMPTOMS THAT BEGAN AFTER     CHEMOTHERAPY    DURING WEEKDAYS, CALL AND ASK TO SPEAK DIRECTLY TO A NURSE.  AT OTHER TIMES, CALL THE OFFICE PHONE NUMBER; THE ANSWERING SERVICE WILL CONTACT THE ON-CALL PHYSICIAN.  SOMEONE IS AVAILABLE 24 HOURS A DAY, SEVEN DAYS A WEEK.

## 2018-03-05 ENCOUNTER — OFFICE VISIT (OUTPATIENT)
Dept: HEMATOLOGY/ONCOLOGY | Facility: CLINIC | Age: 68
End: 2018-03-05
Payer: MEDICARE

## 2018-03-05 VITALS
BODY MASS INDEX: 15.09 KG/M2 | TEMPERATURE: 98 F | DIASTOLIC BLOOD PRESSURE: 81 MMHG | HEART RATE: 96 BPM | WEIGHT: 82 LBS | OXYGEN SATURATION: 96 % | SYSTOLIC BLOOD PRESSURE: 138 MMHG | HEIGHT: 62 IN

## 2018-03-05 DIAGNOSIS — C78.02 MALIGNANT NEOPLASM METASTATIC TO LEFT LUNG: ICD-10-CM

## 2018-03-05 DIAGNOSIS — C34.92 STAGE IV SQUAMOUS CELL CARCINOMA OF LEFT LUNG: Primary | ICD-10-CM

## 2018-03-05 DIAGNOSIS — Z51.11 ENCOUNTER FOR ANTINEOPLASTIC CHEMOTHERAPY: ICD-10-CM

## 2018-03-05 PROCEDURE — 3075F SYST BP GE 130 - 139MM HG: CPT | Mod: S$GLB,,, | Performed by: INTERNAL MEDICINE

## 2018-03-05 PROCEDURE — 99499 UNLISTED E&M SERVICE: CPT | Mod: S$GLB,,, | Performed by: INTERNAL MEDICINE

## 2018-03-05 PROCEDURE — 99215 OFFICE O/P EST HI 40 MIN: CPT | Mod: S$GLB,,, | Performed by: INTERNAL MEDICINE

## 2018-03-05 PROCEDURE — 3079F DIAST BP 80-89 MM HG: CPT | Mod: S$GLB,,, | Performed by: INTERNAL MEDICINE

## 2018-03-05 PROCEDURE — 99999 PR PBB SHADOW E&M-EST. PATIENT-LVL III: CPT | Mod: PBBFAC,,, | Performed by: INTERNAL MEDICINE

## 2018-03-05 NOTE — PROGRESS NOTES
Subjective:       Patient ID: Andree Quiroz is a 67 y.o. female.    Chief Complaint: No chief complaint on file.    Lung Cancer   Associated symptoms include fatigue. Pertinent negatives include no abdominal pain, chest pain, coughing, fever, headaches, nausea, rash or weakness.      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  She had some nausea and dizziness at [employer], went to the ER, chest x-ray was ordered that showed a left suprahilar lesion.  CT of the chest with contrast 9/20/15 Showed 6.5 by 4.4 centimeter left upper lobe mass abutting the mediastinum.  CT-guided biopsy showed squamous cell carcinoma. PET scan did not show any extrathoracic disease. EBUS revealed - Station 4R (contralateral mediastinal lymph node) was negative. Station 7 (subcarinal) was positive for squamous cell carcinoma. Was felt not to be a surgical candidate.    Nov-Dec 2015 - Received concurrent ChemoRT with weekly carbo/taxol.  PET scan July 2016 showed progressive disease.    August 2016 - 2nd line chemotherapy with carboplatin and gemcitabine was started.    Here for follow-up  Denies any chest pain, difficulty breathing or hemoptysis.  No new pains.  No cough.  Continues to stay active.    CT chest 1/11/17 - Continued progression of disease with interval increase in size of bilateral pulmonary nodules.  PDL 1 testing shows 10% (low expression) TPS - tumor proportion score - scheduled to start KEYTRUDA.    Started KEYTRUDA 4/3/17. Received 16 cycles.    Got 1 dose Injectafer 6/5/17.    She f/u with  for CAD. No active issues. Doing well.    Underwent restaging scans and is here for follow-up.    Review of Systems   Constitutional: Positive for fatigue. Negative for activity change, fever and unexpected weight change.   HENT: Negative for facial swelling and nosebleeds.    Eyes: Negative for photophobia and pain.   Respiratory: Negative for cough and shortness of  breath.    Cardiovascular: Negative for chest pain and leg swelling.   Gastrointestinal: Negative for abdominal pain, blood in stool, constipation and nausea.   Genitourinary: Negative for dysuria and hematuria.   Skin: Negative for color change and rash.   Neurological: Negative for seizures, weakness and headaches.   Hematological: Negative for adenopathy. Does not bruise/bleed easily.         Objective:      Physical Exam   Constitutional: She is oriented to person, place, and time. She appears well-developed and well-nourished. No distress.   HENT:   Mouth/Throat: Oropharynx is clear and moist and mucous membranes are normal. Mucous membranes are not pale. No oropharyngeal exudate.   Eyes: Conjunctivae are normal. No scleral icterus.   Neck: Normal range of motion. Neck supple. No thyroid mass (Thyroid area is non-tender) and no thyromegaly present.   Cardiovascular: Normal rate and regular rhythm.  Exam reveals no friction rub.    Pulmonary/Chest: Effort normal and breath sounds normal. No accessory muscle usage or stridor. No respiratory distress. She has no wheezes.   Abdominal: She exhibits no ascites and no mass. There is no hepatosplenomegaly. There is no tenderness.   Musculoskeletal: She exhibits no edema.   No varicosities noted.   Lymphadenopathy:     She has no cervical adenopathy.        Right: No supraclavicular adenopathy present.        Left: No supraclavicular adenopathy present.   Neurological: She is alert and oriented to person, place, and time.   Psychiatric: She has a normal mood and affect. Judgment and thought content normal. Cognition and memory are normal. She exhibits normal recent memory and normal remote memory.       Assessment:     1. 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 Encounter for antineoplastic chemotherapy       Plan:   CT scans 3/2/18 - Interval increase in size of numerous pulmonary nodules including index  nodules.    Reviewed imaging independently and with Ms. Quiroz.    Will discontinue KEYTRUDA due to disease progression.  She received 16 cycles.    Discussed pros and cons of fourth line chemotherapy with carboplatin/etoposide.  Provided her with printed information.  She wants to think about if she wants to proceed.  She states she is getting tired of this.  She is on information she needs.  She states she will call back next week to let me know her decision.  Will honor her wishes.    CAD stable. F/u with .    Addendum 3/13/18 - Ms. Robertson called.  She doesn't want to proceed with fourth line chemotherapy.  She is well aware of the pros and cons.  She is in her right mind.  She is agreeable to surveillance scans.  Will check restaging scans in July.

## 2018-03-13 ENCOUNTER — TELEPHONE (OUTPATIENT)
Dept: HEMATOLOGY/ONCOLOGY | Facility: CLINIC | Age: 68
End: 2018-03-13

## 2018-03-13 NOTE — TELEPHONE ENCOUNTER
Pt requesting to speak with .     ----- Message from Jenny Tyson sent at 3/13/2018  9:18 AM CDT -----  Contact: [phone]/self  Patient requesting to speak with you regarding her health. She states Dr. Sandra is expecting her call. Please advise.

## 2018-03-20 ENCOUNTER — TELEPHONE (OUTPATIENT)
Dept: HEMATOLOGY/ONCOLOGY | Facility: CLINIC | Age: 68
End: 2018-03-20

## 2018-03-20 NOTE — TELEPHONE ENCOUNTER
----- Message from Elidia Finley sent at 3/20/2018 11:04 AM CDT -----  Contact: self / [phone]  Patient is requesting a call back regarding, blood work she wants done. Please advise

## 2018-04-17 ENCOUNTER — TELEPHONE (OUTPATIENT)
Dept: HEMATOLOGY/ONCOLOGY | Facility: CLINIC | Age: 68
End: 2018-04-17

## 2018-04-17 DIAGNOSIS — E83.52 HYPERCALCEMIA: Primary | ICD-10-CM

## 2018-04-17 NOTE — TELEPHONE ENCOUNTER
Informed pt of lab results, scheduled follow up labs and appt. Pt verbalized understanding.     ----- Message from Esha Clark sent at 4/17/2018  9:21 AM CDT -----  Contact: [phone]/ self   Pt its requesting lab work test results done 04/13/18. Please advise

## 2018-05-03 ENCOUNTER — TELEPHONE (OUTPATIENT)
Dept: HEMATOLOGY/ONCOLOGY | Facility: CLINIC | Age: 68
End: 2018-05-03

## 2018-05-03 ENCOUNTER — OFFICE VISIT (OUTPATIENT)
Dept: HEMATOLOGY/ONCOLOGY | Facility: CLINIC | Age: 68
End: 2018-05-03
Payer: MEDICARE

## 2018-05-03 VITALS
TEMPERATURE: 98 F | HEART RATE: 89 BPM | WEIGHT: 76.06 LBS | OXYGEN SATURATION: 95 % | SYSTOLIC BLOOD PRESSURE: 122 MMHG | RESPIRATION RATE: 16 BRPM | BODY MASS INDEX: 14.36 KG/M2 | HEIGHT: 61 IN | DIASTOLIC BLOOD PRESSURE: 68 MMHG

## 2018-05-03 DIAGNOSIS — R63.4 WEIGHT LOSS: ICD-10-CM

## 2018-05-03 DIAGNOSIS — E55.9 VITAMIN D DEFICIENCY: ICD-10-CM

## 2018-05-03 DIAGNOSIS — C34.92 STAGE IV SQUAMOUS CELL CARCINOMA OF LEFT LUNG: Primary | ICD-10-CM

## 2018-05-03 DIAGNOSIS — I25.10 CORONARY ARTERY DISEASE INVOLVING NATIVE CORONARY ARTERY OF NATIVE HEART WITHOUT ANGINA PECTORIS: ICD-10-CM

## 2018-05-03 DIAGNOSIS — C78.02 MALIGNANT NEOPLASM METASTATIC TO LEFT LUNG: ICD-10-CM

## 2018-05-03 PROCEDURE — 99999 PR PBB SHADOW E&M-EST. PATIENT-LVL III: CPT | Mod: PBBFAC,,, | Performed by: INTERNAL MEDICINE

## 2018-05-03 PROCEDURE — 99215 OFFICE O/P EST HI 40 MIN: CPT | Mod: S$GLB,,, | Performed by: INTERNAL MEDICINE

## 2018-05-03 PROCEDURE — 3078F DIAST BP <80 MM HG: CPT | Mod: CPTII,S$GLB,, | Performed by: INTERNAL MEDICINE

## 2018-05-03 PROCEDURE — 99499 UNLISTED E&M SERVICE: CPT | Mod: S$GLB,,, | Performed by: INTERNAL MEDICINE

## 2018-05-03 PROCEDURE — 3074F SYST BP LT 130 MM HG: CPT | Mod: CPTII,S$GLB,, | Performed by: INTERNAL MEDICINE

## 2018-05-03 RX ORDER — ERGOCALCIFEROL 1.25 MG/1
50000 CAPSULE ORAL
Qty: 12 CAPSULE | Refills: 1 | Status: SHIPPED | OUTPATIENT
Start: 2018-05-03 | End: 2019-05-03

## 2018-05-03 RX ORDER — MEGESTROL ACETATE 40 MG/ML
400 SUSPENSION ORAL 2 TIMES DAILY
Qty: 240 ML | Refills: 2 | Status: SHIPPED | OUTPATIENT
Start: 2018-05-03 | End: 2019-05-03

## 2018-05-03 NOTE — TELEPHONE ENCOUNTER
----- Message from Jeffry Barney sent at 5/3/2018  1:19 PM CDT -----  Contact: [phone]/self  Pt requesting to speak with you concerning her medications (name of medication unknown)  Please call and advise

## 2018-05-03 NOTE — TELEPHONE ENCOUNTER
----- Message from Jeffry Barney sent at 5/3/2018  4:44 PM CDT -----  [facility]: [facility]   [phone]  Cyndy is requesting to speak with you concerning the prior authorization for megestrol (MEGACE) 400 mg/10 mL (40 mg/mL) Susp

## 2018-05-03 NOTE — PROGRESS NOTES
Subjective:       Patient ID: Andree Quiroz is a 67 y.o. female.    Chief Complaint: Lung Cancer    Lung Cancer   Associated symptoms include coughing and fatigue. Pertinent negatives include no abdominal pain, chest pain, fever, headaches, nausea, rash or weakness.      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  She had some nausea and dizziness at [employer], went to the ER, chest x-ray was ordered that showed a left suprahilar lesion.  CT of the chest with contrast 9/20/15 Showed 6.5 by 4.4 centimeter left upper lobe mass abutting the mediastinum.  CT-guided biopsy showed squamous cell carcinoma. PET scan did not show any extrathoracic disease. EBUS revealed - Station 4R (contralateral mediastinal lymph node) was negative. Station 7 (subcarinal) was positive for squamous cell carcinoma. Was felt not to be a surgical candidate.    Nov-Dec 2015 - Received concurrent ChemoRT with weekly carbo/taxol.  PET scan July 2016 showed progressive disease.    August 2016 - 2nd line chemotherapy with carboplatin and gemcitabine was started.    CT chest 1/11/17 - Continued progression of disease with interval increase in size of bilateral pulmonary nodules.  PDL 1 testing shows 10% (low expression) TPS - tumor proportion score - scheduled to start KEYTRUDA.    April 2017 - 3rd line Rx with KEYTRUDA started. Received 16 cycles. Dc'd 3/13/18 due to disease progression.    Discussed pros and cons of fourth line chemotherapy with carboplatin/etoposide - she wasn't interested in starting.    Got 1 dose Injectafer 6/5/17.    She f/u with  for CAD. No active issues. Doing well.    Here for follow-up  Has cough, pain in ribs when she coughs  Losing weight  No difficulty breathing or hemoptysis.  Continues to stay active.    Review of Systems   Constitutional: Positive for fatigue and unexpected weight change. Negative for activity change and fever.   HENT: Negative for facial  swelling and nosebleeds.    Eyes: Negative for photophobia and pain.   Respiratory: Positive for cough. Negative for shortness of breath.    Cardiovascular: Negative for chest pain and leg swelling.   Gastrointestinal: Negative for abdominal pain, blood in stool, constipation and nausea.   Genitourinary: Negative for dysuria and hematuria.   Skin: Negative for color change and rash.   Neurological: Negative for seizures, weakness and headaches.   Hematological: Negative for adenopathy. Does not bruise/bleed easily.         Objective:      Physical Exam   Constitutional: She is oriented to person, place, and time. She appears well-developed and well-nourished. No distress.   HENT:   Mouth/Throat: Oropharynx is clear and moist and mucous membranes are normal. Mucous membranes are not pale. No oropharyngeal exudate.   Eyes: Conjunctivae are normal. No scleral icterus.   Neck: Normal range of motion. Neck supple. No thyroid mass (Thyroid area is non-tender) and no thyromegaly present.   Cardiovascular: Normal rate and regular rhythm.  Exam reveals no friction rub.    Pulmonary/Chest: Effort normal and breath sounds normal. No accessory muscle usage or stridor. No respiratory distress. She has no wheezes.   Abdominal: She exhibits no ascites and no mass. There is no hepatosplenomegaly. There is no tenderness.   Musculoskeletal: She exhibits no edema.   No varicosities noted.   Lymphadenopathy:     She has no cervical adenopathy.        Right: No supraclavicular adenopathy present.        Left: No supraclavicular adenopathy present.   Neurological: She is alert and oriented to person, place, and time.   Psychiatric: She has a normal mood and affect. Judgment and thought content normal. Cognition and memory are normal. She exhibits normal recent memory and normal remote memory.       Assessment:     1. 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 Coronary artery  disease involving native coronary artery of native heart without angina pectoris       Plan:   Labs reviewed.  PTH related peptide mildly elevated.  Her hypercalcemia is likely secondary to primary hyperparathyroidism.  The elevated calcium level is mild and she is asymptomatic.    Given diagnosis of stage IV advanced lung cancer, she is not interested in pursuing this further.    For cough - trial of Delsym cough syrup. She wants to try this.    Trial of megace bid for weight loss and loss of appetite. Benefits outweigh any potential risks associated with its use.    Start Vit D 50,000 units weekly    RTC July with scans and labs

## 2018-05-04 ENCOUNTER — TELEPHONE (OUTPATIENT)
Dept: HEMATOLOGY/ONCOLOGY | Facility: CLINIC | Age: 68
End: 2018-05-04

## 2018-05-04 NOTE — TELEPHONE ENCOUNTER
Pt denies further needs now.     ----- Message from Elidia Finley sent at 5/4/2018  3:35 PM CDT -----  Contact: self / [phone]  Her medication has been approved by her insurance company. Please advise

## 2018-05-21 ENCOUNTER — TELEPHONE (OUTPATIENT)
Dept: HEMATOLOGY/ONCOLOGY | Facility: CLINIC | Age: 68
End: 2018-05-21

## 2018-05-21 NOTE — TELEPHONE ENCOUNTER
F/u appt made for tomorrow, 5/22. Pt verbalized understanding.     ----- Message from Carina Orellana sent at 5/21/2018 10:29 AM CDT -----  No.  [phone]   Please call patient with lab results from Friday, 5/18/18.

## 2018-05-22 ENCOUNTER — OFFICE VISIT (OUTPATIENT)
Dept: CARDIOLOGY | Facility: CLINIC | Age: 68
End: 2018-05-22
Payer: MEDICARE

## 2018-05-22 ENCOUNTER — OFFICE VISIT (OUTPATIENT)
Dept: HEMATOLOGY/ONCOLOGY | Facility: CLINIC | Age: 68
End: 2018-05-22
Payer: MEDICARE

## 2018-05-22 VITALS
DIASTOLIC BLOOD PRESSURE: 56 MMHG | HEIGHT: 61 IN | WEIGHT: 77.13 LBS | SYSTOLIC BLOOD PRESSURE: 100 MMHG | BODY MASS INDEX: 14.56 KG/M2 | HEART RATE: 75 BPM

## 2018-05-22 DIAGNOSIS — I10 ESSENTIAL HYPERTENSION: ICD-10-CM

## 2018-05-22 DIAGNOSIS — R63.4 WEIGHT LOSS: ICD-10-CM

## 2018-05-22 DIAGNOSIS — C34.92 STAGE IV SQUAMOUS CELL CARCINOMA OF LEFT LUNG: Primary | ICD-10-CM

## 2018-05-22 DIAGNOSIS — I25.2 OLD MI (MYOCARDIAL INFARCTION): Primary | ICD-10-CM

## 2018-05-22 DIAGNOSIS — C34.92 STAGE IV SQUAMOUS CELL CARCINOMA OF LEFT LUNG: ICD-10-CM

## 2018-05-22 DIAGNOSIS — E83.52 HYPERCALCEMIA OF MALIGNANCY: ICD-10-CM

## 2018-05-22 DIAGNOSIS — I25.10 CORONARY ARTERY DISEASE INVOLVING NATIVE CORONARY ARTERY OF NATIVE HEART WITHOUT ANGINA PECTORIS: ICD-10-CM

## 2018-05-22 DIAGNOSIS — I25.10 CAD (CORONARY ARTERY DISEASE): ICD-10-CM

## 2018-05-22 DIAGNOSIS — Z98.61 S/P PTCA (PERCUTANEOUS TRANSLUMINAL CORONARY ANGIOPLASTY): ICD-10-CM

## 2018-05-22 DIAGNOSIS — E78.5 HYPERLIPIDEMIA, UNSPECIFIED HYPERLIPIDEMIA TYPE: ICD-10-CM

## 2018-05-22 DIAGNOSIS — R55 SYNCOPE AND COLLAPSE: ICD-10-CM

## 2018-05-22 PROCEDURE — 3074F SYST BP LT 130 MM HG: CPT | Mod: CPTII,S$GLB,, | Performed by: INTERNAL MEDICINE

## 2018-05-22 PROCEDURE — 99214 OFFICE O/P EST MOD 30 MIN: CPT | Mod: S$GLB,,, | Performed by: INTERNAL MEDICINE

## 2018-05-22 PROCEDURE — 99999 PR PBB SHADOW E&M-EST. PATIENT-LVL III: CPT | Mod: PBBFAC,,, | Performed by: INTERNAL MEDICINE

## 2018-05-22 PROCEDURE — 3078F DIAST BP <80 MM HG: CPT | Mod: CPTII,S$GLB,, | Performed by: INTERNAL MEDICINE

## 2018-05-22 PROCEDURE — 93010 ELECTROCARDIOGRAM REPORT: CPT | Mod: S$GLB,,, | Performed by: INTERNAL MEDICINE

## 2018-05-22 PROCEDURE — 99999 PR PBB SHADOW E&M-EST. PATIENT-LVL I: CPT | Mod: PBBFAC,,, | Performed by: INTERNAL MEDICINE

## 2018-05-22 PROCEDURE — 93005 ELECTROCARDIOGRAM TRACING: CPT | Mod: S$GLB,,, | Performed by: INTERNAL MEDICINE

## 2018-05-22 PROCEDURE — 99215 OFFICE O/P EST HI 40 MIN: CPT | Mod: S$GLB,,, | Performed by: INTERNAL MEDICINE

## 2018-05-22 RX ORDER — DEXTROMETHORPHAN POLISTIREX 30 MG/5ML
60 SUSPENSION ORAL 2 TIMES DAILY
COMMUNITY

## 2018-05-22 RX ORDER — CARVEDILOL 12.5 MG/1
6.25 TABLET ORAL DAILY
Qty: 180 TABLET | Refills: 3 | Status: SHIPPED | OUTPATIENT
Start: 2018-05-22 | End: 2019-05-22

## 2018-05-22 NOTE — PROGRESS NOTES
"  Subjective:      Patient ID: Andree Quiroz is a 67 y.o. female.    Chief Complaint: Follow-up (feelilng dizzy today.)    HPI:  Pt feels weak.  Pt feels dizzy.  "I have had vertigo once or twice before."  Pt has had 2 falls since Mother's Day. With second fall pt had an abrasion of her forehead.  When I cough or breathe or burp pt gets pain en left lateral rib cage.  Pt has an appt to see Dr Sandra today who is concerned about hypercalcemia of 12.  Review of Systems   Cardiovascular: Positive for chest pain (pleuritic left lateral) and syncope. Negative for claudication, dyspnea on exertion, irregular heartbeat, leg swelling, near-syncope, orthopnea and palpitations.      Pt is eating better since taking Megace.    Some easy fatiguability  Past Medical History:   Diagnosis Date    Acute myocardial infarction of anterior wall 04/26/2012    2-stents 04/26/12 and 1 setnt 07/24/12. - seen by Dr. Lopez    Coronary arteriosclerosis     was with John (retired) and now with Bhavya    Depression     Disorder of vein     Hyperlipidemia     Hypertension     Insomnia     Lateral epicondylitis     Lung cancer     Treated by Dr. Sandra    Mammogram abnormal     Osteoarthritis     Pityriasis versicolor         Past Surgical History:   Procedure Laterality Date    CORONARY ANGIOPLASTY  04/2012    x3 stents    HYSTERECTOMY  1979    partial unsure when last PAP was       Family History   Problem Relation Age of Onset    Heart disease Father         heart attack    Hypertension Mother        Social History     Social History    Marital status:      Spouse name: N/A    Number of children: N/A    Years of education: N/A     Social History Main Topics    Smoking status: Former Smoker     Packs/day: 0.10     Years: 30.00    Smokeless tobacco: Never Used      Comment: Pt down to 1 cigarette daily    Alcohol use No    Drug use: No    Sexual activity: Not Asked     Other Topics Concern    " "None     Social History Narrative    None       Current Outpatient Prescriptions on File Prior to Visit   Medication Sig Dispense Refill    ACETAMINOPHEN (TYLENOL EXTRA STRENGTH ORAL) Take 1 tablet by mouth once daily at 6am.      aspirin (ECOTRIN) 81 MG EC tablet Take 81 mg by mouth once daily.       atorvastatin (LIPITOR) 20 MG tablet Take 0.5 tablets (10 mg total) by mouth every evening. 45 tablet 3    clopidogrel (PLAVIX) 75 mg tablet Take 1 tablet (75 mg total) by mouth once daily. 90 tablet 3    ergocalciferol (VITAMIN D2) 50,000 unit Cap Take 1 capsule (50,000 Units total) by mouth every 7 days. 12 capsule 1    megestrol (MEGACE) 400 mg/10 mL (40 mg/mL) Susp Take 10 mLs (400 mg total) by mouth 2 (two) times daily. 240 mL 2    [DISCONTINUED] carvedilol (COREG) 12.5 MG tablet TAKE 1 TABLET (12.5 MG TOTAL) BY MOUTH 2 (TWO) TIMES DAILY WITH MEALS. 180 tablet 3    PEMBROLIZUMAB (KEYTRUDA IV) Inject into the vein. Treatment every 3 weeks      [DISCONTINUED] omeprazole (PRILOSEC) 20 MG capsule Take 1 capsule (20 mg total) by mouth once daily. 90 capsule 1     No current facility-administered medications on file prior to visit.        Review of patient's allergies indicates:   Allergen Reactions    Codeine Other (See Comments) and Anxiety     "Made me nervous and shaky"     Objective:     Vitals:    05/22/18 1106   BP: (!) 100/56   BP Location: Right arm   Patient Position: Sitting   BP Method: Small (Manual)   Pulse: 75   Weight: 35 kg (77 lb 1.6 oz)   Height: 5' 1.25" (1.556 m)        Physical Exam   Constitutional: She is oriented to person, place, and time. She appears well-developed and well-nourished.   Eyes: No scleral icterus.   Neck: No JVD present. Carotid bruit is not present.   Cardiovascular: Normal rate and regular rhythm.  Exam reveals no gallop.    Murmur (II/VI systolic) heard.  Pulmonary/Chest: She has decreased breath sounds in the left lower field. She has rhonchi in the left lower " field.   Musculoskeletal: She exhibits no edema.   Neurological: She is alert and oriented to person, place, and time.   Skin: Skin is warm and dry.   Psychiatric: She has a normal mood and affect. Her behavior is normal. Judgment and thought content normal.   Vitals reviewed.         ECG today: NSR, WNL    Wt down 5 lbs over past 3 months      Assessment:     1. Old MI (myocardial infarction)    2. S/P PTCA (percutaneous transluminal coronary angioplasty)    3. Coronary artery disease involving native coronary artery of native heart without angina pectoris    4. Essential hypertension    5. Hyperlipidemia, unspecified hyperlipidemia type    6. Stage IV squamous cell carcinoma of left lung    7. Weight loss    8. Syncope and collapse    9. CAD (coronary artery disease)      Plan:   Andree was seen today for follow-up.    Diagnoses and all orders for this visit:    Old MI (myocardial infarction)  -     IN OFFICE EKG 12-LEAD (to Muse)    S/P PTCA (percutaneous transluminal coronary angioplasty)    Coronary artery disease involving native coronary artery of native heart without angina pectoris    Essential hypertension  -     carvedilol (COREG) 12.5 MG tablet; Take 0.5 tablets (6.25 mg total) by mouth once daily.    Hyperlipidemia, unspecified hyperlipidemia type    Stage IV squamous cell carcinoma of left lung    Weight loss    Syncope and collapse    CAD (coronary artery disease)  -     IN OFFICE EKG 12-LEAD (to Muse)       Due to lowish blood pressure and recent falls will reduce the carvedilol to one-half tablet daily  RTC one month  See Dr Sandra today to address hypercalcemia and to consider brain MRI or CT  Follow-up in about 4 weeks (around 6/19/2018).

## 2018-05-23 NOTE — PROGRESS NOTES
Subjective:       Patient ID: Andree Quiroz is a 67 y.o. female.    Chief Complaint: No chief complaint on file.    Lung Cancer   Associated symptoms include coughing and fatigue. Pertinent negatives include no abdominal pain, chest pain, fever, headaches, nausea, rash or weakness.      She is here for lung cancer follow-up.  Diagnosed in September 2015.    She currently has stage IV disease - squamous cell carcinoma of the left lung.    She was treated with concurrent chemotherapy RT with weekly carboplatin/Taxol ending December 2015 then received second line carboplatin/gemcitabine followed by third line KEYTRUDA that was discontinued in March 2018.    She f/u with  for CAD. No active issues. Doing well in that regard.    She is here for a follow-up today and continues to lose weight.  She doesn't have an appetite.  She sustained a couple of falls since the last time I saw her.    Last imaging showed progressive disease.  Labs also reveal hypercalcemia.    Review of Systems   Constitutional: Positive for fatigue and unexpected weight change. Negative for activity change and fever.   HENT: Negative for facial swelling and nosebleeds.    Eyes: Negative for photophobia and pain.   Respiratory: Positive for cough. Negative for shortness of breath.    Cardiovascular: Negative for chest pain and leg swelling.   Gastrointestinal: Negative for abdominal pain, blood in stool, constipation and nausea.   Genitourinary: Negative for dysuria and hematuria.   Skin: Negative for color change and rash.   Neurological: Negative for seizures, weakness and headaches.   Hematological: Negative for adenopathy. Does not bruise/bleed easily.         Objective:      Physical Exam   Constitutional: She is oriented to person, place, and time. She appears well-developed and well-nourished. No distress.   HENT:   Mouth/Throat: Oropharynx is clear and moist and mucous membranes are normal. Mucous membranes are not pale. No  oropharyngeal exudate.   Eyes: Conjunctivae are normal. No scleral icterus.   Neck: Normal range of motion. Neck supple. No thyroid mass (Thyroid area is non-tender) and no thyromegaly present.   Cardiovascular: Normal rate and regular rhythm.  Exam reveals no friction rub.    Pulmonary/Chest: Effort normal and breath sounds normal. No accessory muscle usage or stridor. No respiratory distress. She has no wheezes.   Abdominal: She exhibits no ascites and no mass. There is no hepatosplenomegaly. There is no tenderness.   Musculoskeletal: She exhibits no edema.   No varicosities noted.   Lymphadenopathy:     She has no cervical adenopathy.        Right: No supraclavicular adenopathy present.        Left: No supraclavicular adenopathy present.   Neurological: She is alert and oriented to person, place, and time.   Psychiatric: She has a normal mood and affect. Judgment and thought content normal. Cognition and memory are normal. She exhibits normal recent memory and normal remote memory.       Assessment:     1. 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 Hypercalcemia of malignancy    3. Weight loss       Plan:   Correction to my last clinic note - Her PTH related peptide is elevated and her hypercalcemia is secondary to progressive squamous cell carcinoma of the lung.    Check MRI of the brain given recent falls and concern for brain metastases.    Start Denosumab for hypercalcemia associated with malignancy.    If symptoms worsen, asked her to proceed to the ED in the interim.    Will see her back for follow-up in 2 weeks after she gets Xgeva.    For cough - trial of Delsym cough syrup.

## 2018-05-25 ENCOUNTER — INFUSION (OUTPATIENT)
Dept: INFUSION THERAPY | Facility: HOSPITAL | Age: 68
End: 2018-05-25
Attending: INTERNAL MEDICINE
Payer: MEDICARE

## 2018-05-25 VITALS
BODY MASS INDEX: 14.54 KG/M2 | HEIGHT: 61 IN | HEART RATE: 91 BPM | DIASTOLIC BLOOD PRESSURE: 67 MMHG | RESPIRATION RATE: 18 BRPM | WEIGHT: 77 LBS | TEMPERATURE: 98 F | SYSTOLIC BLOOD PRESSURE: 121 MMHG

## 2018-05-25 DIAGNOSIS — D50.8 IRON DEFICIENCY ANEMIA SECONDARY TO INADEQUATE DIETARY IRON INTAKE: ICD-10-CM

## 2018-05-25 DIAGNOSIS — E83.52 HYPERCALCEMIA OF MALIGNANCY: Primary | ICD-10-CM

## 2018-05-25 PROCEDURE — 63600175 PHARM REV CODE 636 W HCPCS: Mod: JG | Performed by: INTERNAL MEDICINE

## 2018-05-25 PROCEDURE — 96372 THER/PROPH/DIAG INJ SC/IM: CPT

## 2018-05-25 RX ADMIN — DENOSUMAB 120 MG: 120 INJECTION SUBCUTANEOUS at 11:05

## 2018-06-13 ENCOUNTER — OFFICE VISIT (OUTPATIENT)
Dept: HEMATOLOGY/ONCOLOGY | Facility: CLINIC | Age: 68
End: 2018-06-13
Payer: MEDICARE

## 2018-06-13 VITALS
OXYGEN SATURATION: 97 % | HEIGHT: 62 IN | BODY MASS INDEX: 13.8 KG/M2 | HEART RATE: 87 BPM | WEIGHT: 75 LBS | SYSTOLIC BLOOD PRESSURE: 139 MMHG | DIASTOLIC BLOOD PRESSURE: 82 MMHG | TEMPERATURE: 98 F

## 2018-06-13 DIAGNOSIS — I25.10 CORONARY ARTERY DISEASE INVOLVING NATIVE CORONARY ARTERY OF NATIVE HEART WITHOUT ANGINA PECTORIS: ICD-10-CM

## 2018-06-13 DIAGNOSIS — R63.4 WEIGHT LOSS: ICD-10-CM

## 2018-06-13 DIAGNOSIS — E83.52 HYPERCALCEMIA OF MALIGNANCY: ICD-10-CM

## 2018-06-13 DIAGNOSIS — Z51.5 PALLIATIVE CARE ENCOUNTER: ICD-10-CM

## 2018-06-13 DIAGNOSIS — C34.92 STAGE IV SQUAMOUS CELL CARCINOMA OF LEFT LUNG: ICD-10-CM

## 2018-06-13 DIAGNOSIS — C78.02 MALIGNANT NEOPLASM METASTATIC TO LEFT LUNG: Primary | ICD-10-CM

## 2018-06-13 PROCEDURE — 3079F DIAST BP 80-89 MM HG: CPT | Mod: CPTII,S$GLB,, | Performed by: INTERNAL MEDICINE

## 2018-06-13 PROCEDURE — 99999 PR PBB SHADOW E&M-EST. PATIENT-LVL III: CPT | Mod: PBBFAC,,, | Performed by: INTERNAL MEDICINE

## 2018-06-13 PROCEDURE — 99215 OFFICE O/P EST HI 40 MIN: CPT | Mod: S$GLB,,, | Performed by: INTERNAL MEDICINE

## 2018-06-13 PROCEDURE — 99499 UNLISTED E&M SERVICE: CPT | Mod: S$GLB,,, | Performed by: INTERNAL MEDICINE

## 2018-06-13 PROCEDURE — 3075F SYST BP GE 130 - 139MM HG: CPT | Mod: CPTII,S$GLB,, | Performed by: INTERNAL MEDICINE

## 2018-06-13 RX ORDER — AZITHROMYCIN 250 MG/1
TABLET, FILM COATED ORAL
Qty: 6 TABLET | Refills: 0 | Status: SHIPPED | OUTPATIENT
Start: 2018-06-13

## 2018-06-13 NOTE — PROGRESS NOTES
Subjective:       Patient ID: Andree Quiroz is a 67 y.o. female.    Chief Complaint: No chief complaint on file.    Lung Cancer   Associated symptoms include arthralgias, coughing and fatigue. Pertinent negatives include no abdominal pain, chest pain, fever, headaches, nausea, rash or weakness.      She is here for lung cancer follow-up.  Diagnosed in September 2015.    She currently has stage IV disease - squamous cell carcinoma of the left lung.    She was treated with concurrent chemotherapy RT with weekly carboplatin/Taxol ending December 2015 then received second line carboplatin/gemcitabine followed by third line KEYTRUDA that was discontinued in March 2018.    She f/u with  for CAD. No active issues. Doing well in that regard.    She is here for a follow-up today and continues to lose weight.  She doesn't have an appetite.      Last imaging showed progressive disease.  Labs also reveal hypercalcemia - improved with XGEVA.    Now having more cough then before.    Review of Systems   Constitutional: Positive for fatigue and unexpected weight change. Negative for activity change and fever.   HENT: Negative for facial swelling and nosebleeds.    Eyes: Negative for photophobia and pain.   Respiratory: Positive for cough. Negative for shortness of breath.    Cardiovascular: Negative for chest pain and leg swelling.   Gastrointestinal: Negative for abdominal pain, blood in stool, constipation and nausea.   Genitourinary: Negative for dysuria and hematuria.   Musculoskeletal: Positive for arthralgias and gait problem.   Skin: Negative for color change and rash.   Neurological: Negative for seizures, weakness and headaches.   Hematological: Negative for adenopathy. Does not bruise/bleed easily.   Psychiatric/Behavioral: The patient is nervous/anxious.          Objective:      Physical Exam   Constitutional: She is oriented to person, place, and time. She appears well-developed and well-nourished. No  distress.   HENT:   Mouth/Throat: Oropharynx is clear and moist and mucous membranes are normal. Mucous membranes are not pale. No oropharyngeal exudate.   Eyes: Conjunctivae are normal. No scleral icterus.   Neck: Normal range of motion. Neck supple. No thyroid mass (Thyroid area is non-tender) and no thyromegaly present.   Cardiovascular: Normal rate and regular rhythm.  Exam reveals no friction rub.    Pulmonary/Chest: Effort normal and breath sounds normal. No accessory muscle usage or stridor. No respiratory distress. She has no wheezes.   Abdominal: She exhibits no ascites and no mass. There is no hepatosplenomegaly. There is no tenderness.   Musculoskeletal: She exhibits no edema.   No varicosities noted.   Lymphadenopathy:     She has no cervical adenopathy.        Right: No supraclavicular adenopathy present.        Left: No supraclavicular adenopathy present.   Neurological: She is alert and oriented to person, place, and time.   Psychiatric: She has a normal mood and affect. Judgment and thought content normal. Cognition and memory are normal. She exhibits normal recent memory and normal remote memory.       Assessment:     1. Malignant neoplasm metastatic to left lung    2. 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 Hypercalcemia of malignancy    4. Weight loss    5. Coronary artery disease involving native coronary artery of native heart without angina pectoris       Plan:   Recent imaging shows progressive disease.    She now has cancer cachexia.  Not interested in palliative chemotherapy.    Prescribe Z-Philipp for cough.  Continue Delsym as needed for cough.  She states she is allergic to codeine.    Recommended hospice.  She is agreeable.  We will arrange.

## 2018-07-01 DIAGNOSIS — C34.92 STAGE IV SQUAMOUS CELL CARCINOMA OF LEFT LUNG: ICD-10-CM

## 2018-07-01 RX ORDER — MEGESTROL ACETATE 40 MG/ML
400 SUSPENSION ORAL 2 TIMES DAILY
Qty: 240 ML | Refills: 2 | OUTPATIENT
Start: 2018-07-01 | End: 2019-07-01

## 2018-09-07 DIAGNOSIS — Z11.59 NEED FOR HEPATITIS C SCREENING TEST: ICD-10-CM

## 2018-09-07 DIAGNOSIS — Z12.39 BREAST CANCER SCREENING: ICD-10-CM

## 2019-02-08 NOTE — PLAN OF CARE
Problem: Patient Care Overview  Goal: Plan of Care Review  Outcome: Ongoing (interventions implemented as appropriate)  Pt states she feels well today, no complaints.       Detail Level: Zone
